# Patient Record
Sex: MALE | Race: WHITE | NOT HISPANIC OR LATINO | Employment: OTHER | URBAN - METROPOLITAN AREA
[De-identification: names, ages, dates, MRNs, and addresses within clinical notes are randomized per-mention and may not be internally consistent; named-entity substitution may affect disease eponyms.]

---

## 2017-11-21 ENCOUNTER — OFFICE VISIT (OUTPATIENT)
Dept: FAMILY MEDICINE | Facility: CLINIC | Age: 52
End: 2017-11-21
Payer: COMMERCIAL

## 2017-11-21 ENCOUNTER — RADIANT APPOINTMENT (OUTPATIENT)
Dept: GENERAL RADIOLOGY | Facility: CLINIC | Age: 52
End: 2017-11-21
Attending: FAMILY MEDICINE
Payer: COMMERCIAL

## 2017-11-21 VITALS
SYSTOLIC BLOOD PRESSURE: 134 MMHG | DIASTOLIC BLOOD PRESSURE: 80 MMHG | HEIGHT: 75 IN | HEART RATE: 100 BPM | WEIGHT: 186 LBS | TEMPERATURE: 97.3 F | BODY MASS INDEX: 23.13 KG/M2

## 2017-11-21 DIAGNOSIS — M54.40 LOW BACK PAIN WITH SCIATICA, SCIATICA LATERALITY UNSPECIFIED, UNSPECIFIED BACK PAIN LATERALITY, UNSPECIFIED CHRONICITY: ICD-10-CM

## 2017-11-21 DIAGNOSIS — M54.40 LOW BACK PAIN WITH SCIATICA, SCIATICA LATERALITY UNSPECIFIED, UNSPECIFIED BACK PAIN LATERALITY, UNSPECIFIED CHRONICITY: Primary | ICD-10-CM

## 2017-11-21 PROCEDURE — 99214 OFFICE O/P EST MOD 30 MIN: CPT | Performed by: FAMILY MEDICINE

## 2017-11-21 PROCEDURE — 72100 X-RAY EXAM L-S SPINE 2/3 VWS: CPT

## 2017-11-21 RX ORDER — TRAMADOL HYDROCHLORIDE 50 MG/1
50-100 TABLET ORAL EVERY 8 HOURS PRN
Qty: 10 TABLET | Refills: 0 | Status: SHIPPED | OUTPATIENT
Start: 2017-11-21 | End: 2017-12-08

## 2017-11-21 RX ORDER — METHYLPREDNISOLONE 4 MG
TABLET, DOSE PACK ORAL
Qty: 21 TABLET | Refills: 0 | Status: SHIPPED | OUTPATIENT
Start: 2017-11-21 | End: 2018-02-09

## 2017-11-21 NOTE — PROGRESS NOTES
SUBJECTIVE:   Kip Winn is a 52 year old male who presents to clinic today for the following health issues:      Back Pain   Noticed back discomfort almost 1 week ago. Associated with right leg radiation mild numbness. Sitting makes it worse standing make it better. He has tried over-the-counter ibuprofen  which has not helped.  He has similar issues in the past but they resolve on their own. No fall or trauma.    Duration: 1+week        Specific cause: none    Description:   Location of pain: low back bilateral  Character of pain: sharp and dull ache  Pain radiation:radiates into the right buttocks and radiates into the right leg  New numbness or weakness in legs, not attributed to pain:  YES    Intensity:  moderate    History:   Pain interferes with job: YES,   History of back problems: no prior back problems  Any previous MRI or X-rays: None  Sees a specialist for back pain:  No  Therapies tried without relief: NSAIDs    Alleviating factors:   Improved by: none      Precipitating factors:  Worsened by: Sitting    Functional and Psychosocial Screen (Denice STarT Back):      Not performed today          Problem list and histories reviewed & adjusted, as indicated.  Additional history: as documented    Patient Active Problem List   Diagnosis     CARDIOVASCULAR SCREENING; LDL GOAL LESS THAN 130     Smoker     Family history of prostate cancer     Hemangioma of forehead     Low HDL (under 40)     Past Surgical History:   Procedure Laterality Date     EYE SURGERY      Lasik 5 years ago     NO HISTORY OF SURGERY         Social History   Substance Use Topics     Smoking status: Current Every Day Smoker     Packs/day: 1.00     Years: 20.00     Types: Cigarettes     Smokeless tobacco: Never Used     Alcohol use 0.0 oz/week     0 Standard drinks or equivalent per week      Comment: 2 drinks a month     Family History   Problem Relation Age of Onset     Hypertension Mother      C.A.D. Maternal Grandfather      50s   "from heart disease     Prostate Cancer Father 61     Hypertension Maternal Grandmother      DIABETES Mother      borderline         Current Outpatient Prescriptions   Medication Sig Dispense Refill     methylPREDNISolone (MEDROL DOSEPAK) 4 MG tablet Follow package instructions 21 tablet 0     traMADol (ULTRAM) 50 MG tablet Take 1-2 tablets ( mg) by mouth every 8 hours as needed for pain 10 tablet 0     Ascorbic Acid (VITAMIN C PO) Take 1 tablet by mouth.       VITAMIN D PO Take 1 tablet by mouth.       ASPIRIN 81 MG OR TABS ONE DAILY 100 3     MULTI-VITAMIN OR TABS 1 TABLET DAILY           Reviewed and updated as needed this visit by clinical staffTobacco  Allergies  Meds       Reviewed and updated as needed this visit by Provider         ROS:  C: NEGATIVE for fever, chills, change in weight  ROS otherwise negative    OBJECTIVE:                                                    /80  Pulse 100  Temp 97.3  F (36.3  C) (Tympanic)  Ht 6' 2.5\" (1.892 m)  Wt 186 lb (84.4 kg)  BMI 23.56 kg/m2  Body mass index is 23.56 kg/(m^2).   GENERAL: healthy, alert, well nourished, well hydrated, no distress  RESP: lungs clear to auscultation - no rales, no rhonchi, no wheezes  CV: regular rates and rhythm, normal S1 S2, no S3 or S4 and no murmur, no click or rub -  ABDOMEN: soft, no tenderness, no  hepatosplenomegaly, no masses, normal bowel sounds  Pain in the paraspinous area on the right side in the lumbar region.   Straight leg raise is positive on the right side.      ASSESSMENT/PLAN:                                                        ICD-10-CM    1. Low back pain with sciatica, sciatica laterality unspecified, unspecified back pain laterality, unspecified chronicity M54.40 XR Lumbar Spine 2/3 Views     methylPREDNISolone (MEDROL DOSEPAK) 4 MG tablet     traMADol (ULTRAM) 50 MG tablet       Patient lumbar x-rays reviewed. No obvious abnormality. Follow up on official report. Patient has low back pain " with possible L4-L5 nerve inflammation mild compression. We will start patient on Medrol Dosepak along with tramadol only as needed. He will follow up in 2 weeks if any change in symptoms. If pain is worsening or not getting better I would get an MRI and make further recommendation based on MRI results.  Warning signs were discussed with the patient.    Dany Richardson MD  [facility] [place]

## 2017-11-21 NOTE — NURSING NOTE
"Chief Complaint   Patient presents with     Back Pain       Initial /80  Pulse 100  Temp 97.3  F (36.3  C) (Tympanic)  Ht 6' 2.5\" (1.892 m)  Wt 186 lb (84.4 kg)  BMI 23.56 kg/m2 Estimated body mass index is 23.56 kg/(m^2) as calculated from the following:    Height as of this encounter: 6' 2.5\" (1.892 m).    Weight as of this encounter: 186 lb (84.4 kg).  Medication Reconciliation: complete    Current Outpatient Prescriptions   Medication Sig Dispense Refill     Ascorbic Acid (VITAMIN C PO) Take 1 tablet by mouth.       VITAMIN D PO Take 1 tablet by mouth.       ASPIRIN 81 MG OR TABS ONE DAILY 100 3     MULTI-VITAMIN OR TABS 1 TABLET DAILY         Daniel PRESSLEY CMA  "

## 2017-11-21 NOTE — MR AVS SNAPSHOT
"              After Visit Summary   11/21/2017    Kip Winn    MRN: [id number]           Patient Information     Date Of Birth          1965        Visit Information        Provider Department      11/21/2017 9:40 AM Dany Richardson MD [facility] [place]        Today's Diagnoses     Low back pain with sciatica, sciatica laterality unspecified, unspecified back pain laterality, unspecified chronicity    -  1       Follow-ups after your visit        Who to contact     If you have questions or need follow up information about today's clinic visit or your schedule please contact [facility] [place] directly at [phone].  Normal or non-critical lab and imaging results will be communicated to you by MyChart, letter or phone within 4 business days after the clinic has received the results. If you do not hear from us within 7 days, please contact the clinic through "[employer] or phone. If you have a critical or abnormal lab result, we will notify you by phone as soon as possible.  Submit refill requests through [employer] [facility] [employer] or call your pharmacy and they will forward the refill request to us. Please allow 3 business days for your refill to be completed.          Additional Information About Your Visit        MyChart Information     [employer] gives you secure access to your electronic health record. If you see a primary care provider, you can also send messages to your care team and make appointments. If you have questions, please call your primary care clinic.  If you do not have a primary care provider, please call [phone] and they will assist you.        Care EveryWhere ID     This is your Care EveryWhere ID. This could be used by other organizations to access your [facility] medical records  [id number]        Your Vitals Were     Pulse Temperature Height BMI (Body Mass Index)          100 97.3  F (36.3  C) (Tympanic) 6' 2.5\" (1.892 m) 23.56 kg/m2         Blood Pressure from Last 3 Encounters: "   11/21/17 134/80   10/07/16 112/64   10/06/15 104/60    Weight from Last 3 Encounters:   11/21/17 186 lb (84.4 kg)   10/07/16 197 lb (89.4 kg)   10/06/15 195 lb (88.5 kg)                 Today's Medication Changes          These changes are accurate as of: 11/21/17 10:21 AM.  If you have any questions, ask your nurse or doctor.               Start taking these medicines.        Dose/Directions    methylPREDNISolone 4 MG tablet   Commonly known as:  MEDROL DOSEPAK   Used for:  Low back pain with sciatica, sciatica laterality unspecified, unspecified back pain laterality, unspecified chronicity   Started by:  Dany Richardson MD        Follow package instructions   Quantity:  21 tablet   Refills:  0       traMADol 50 MG tablet   Commonly known as:  ULTRAM   Used for:  Low back pain with sciatica, sciatica laterality unspecified, unspecified back pain laterality, unspecified chronicity   Started by:  Dany Richardson MD        Dose:   mg   Take 1-2 tablets ( mg) by mouth every 8 hours as needed for pain   Quantity:  10 tablet   Refills:  0            Where to get your medicines      These medications were sent to [place] [address] [place] [facility] [place] - [address]  [address] [place] [address]     Phone:  [phone]     methylPREDNISolone 4 MG tablet         Some of these will need a paper prescription and others can be bought over the counter.  Ask your nurse if you have questions.     Bring a paper prescription for each of these medications     traMADol 50 MG tablet                Primary Care Provider Office Phone # Fax #    Dany Richardson [phone]       [address]  [place] [address]        Equal Access to Services     SHABBIR TRINH AH: Hadii marisol Arenas, waaxda luqadaha, qaybta kaalmada adeegyada, kika christine. So [facility] [phone].    ATENCIÓN: Si habla español, tiene a capps disposición servicios gratuitos de  jackie rangela. Emely al [phone].    We comply with applicable federal civil rights laws and [place] laws. We do not discriminate on the basis of race, color, national origin, age, disability, sex, sexual orientation, or gender identity.            Thank you!     Thank you for choosing [facility] [place]  for your care. Our goal is always to provide you with excellent care. Hearing back from our patients is one way we can continue to improve our services. Please take a few minutes to complete the written survey that you may receive in the mail after your visit with us. Thank you!             Your Updated Medication List - Protect others around you: Learn how to safely use, store and throw away your medicines at www.disposemymeds.org.          This list is accurate as of: 11/21/17 10:21 AM.  Always use your most recent med list.                   Brand Name Dispense Instructions for use Diagnosis    aspirin 81 MG tablet     100    ONE DAILY        methylPREDNISolone 4 MG tablet    MEDROL DOSEPAK    21 tablet    Follow package instructions    Low back pain with sciatica, sciatica laterality unspecified, unspecified back pain laterality, unspecified chronicity       Multi-vitamin Tabs tablet   Generic drug:  multivitamin, therapeutic with minerals      1 TABLET DAILY        traMADol 50 MG tablet    ULTRAM    10 tablet    Take 1-2 tablets ( mg) by mouth every 8 hours as needed for pain    Low back pain with sciatica, sciatica laterality unspecified, unspecified back pain laterality, unspecified chronicity       VITAMIN C PO      Take 1 tablet by mouth.        VITAMIN D PO      Take 1 tablet by mouth.

## 2017-11-24 ENCOUNTER — TRANSFERRED RECORDS (OUTPATIENT)
Dept: HEALTH INFORMATION MANAGEMENT | Facility: CLINIC | Age: 52
End: 2017-11-24

## 2017-11-30 ENCOUNTER — MYC MEDICAL ADVICE (OUTPATIENT)
Dept: FAMILY MEDICINE | Facility: CLINIC | Age: 52
End: 2017-11-30

## 2017-11-30 DIAGNOSIS — M54.16 LUMBAR RADICULAR PAIN: Primary | ICD-10-CM

## 2017-11-30 NOTE — TELEPHONE ENCOUNTER
Please see [employer] message and advise.      Thank you,  Jessica Ruvalcaba RN  [facility]  [phone]

## 2017-11-30 NOTE — TELEPHONE ENCOUNTER
Confirmed that [facility] had the ordered and that the priority was STAT. Notified the patient per My Chart that they would be contacting him soon to schedule. Manisha Reveles RN

## 2017-12-01 ENCOUNTER — TRANSFERRED RECORDS (OUTPATIENT)
Dept: HEALTH INFORMATION MANAGEMENT | Facility: CLINIC | Age: 52
End: 2017-12-01

## 2017-12-05 ENCOUNTER — TELEPHONE (OUTPATIENT)
Dept: FAMILY MEDICINE | Facility: CLINIC | Age: 52
End: 2017-12-05

## 2017-12-05 DIAGNOSIS — M51.26 DISPLACEMENT OF LUMBAR INTERVERTEBRAL DISC WITHOUT MYELOPATHY: Primary | ICD-10-CM

## 2017-12-05 DIAGNOSIS — M54.16 LUMBAR RADICULOPATHY: ICD-10-CM

## 2017-12-05 NOTE — TELEPHONE ENCOUNTER
Spoke with the patient regarding his MRI.  He has L4-L5 disc herniation with impingement of nerve.  He is referred to neurosurgery for evaluation.  Referral provided.  Warning signs were discussed with the patient.  His pain is well controlled.

## 2017-12-08 ENCOUNTER — MYC REFILL (OUTPATIENT)
Dept: FAMILY MEDICINE | Facility: CLINIC | Age: 52
End: 2017-12-08

## 2017-12-08 DIAGNOSIS — M54.40 LOW BACK PAIN WITH SCIATICA, SCIATICA LATERALITY UNSPECIFIED, UNSPECIFIED BACK PAIN LATERALITY, UNSPECIFIED CHRONICITY: ICD-10-CM

## 2017-12-08 RX ORDER — TRAMADOL HYDROCHLORIDE 50 MG/1
50-100 TABLET ORAL EVERY 8 HOURS PRN
Qty: 10 TABLET | Refills: 0 | Status: SHIPPED | OUTPATIENT
Start: 2017-12-08 | End: 2018-02-09

## 2017-12-08 NOTE — TELEPHONE ENCOUNTER
Controlled Substance Refill Request for Tramadol  Problem List Complete:  No     PROVIDER TO CONSIDER COMPLETION OF PROBLEM LIST AND OVERVIEW/CONTROLLED SUBSTANCE AGREEMENT    Last Written Prescription Date:  11/21/2017  Last Fill Quantity: 10,   # refills: 0    Last Office Visit with [facility] primary care provider: 11/21/2017    Future Office visit:     Controlled substance agreement on file: No.     Processing:  Patient will  in clinic     checked in past 6 months?  Yes 12/8/2017 - No concern noted. only previous script noted     Routing refill request to provider for review/approval because:  Drug not on the [facility], [facility] or [facility] refill protocol or controlled substance    Olive Duran RN - Triage  [facility]

## 2017-12-08 NOTE — TELEPHONE ENCOUNTER
Message from [employer]:  Original authorizing provider: MD Kip Brody would like a refill of the following medications:  traMADol (ULTRAM) 50 MG tablet [Dany Richardson MD]    [facility] [address] [place] [facility] [place] [facility] [address]    Comment:

## 2017-12-13 ENCOUNTER — OFFICE VISIT (OUTPATIENT)
Dept: NEUROSURGERY | Facility: CLINIC | Age: 52
End: 2017-12-13
Attending: NURSE PRACTITIONER
Payer: COMMERCIAL

## 2017-12-13 VITALS — HEIGHT: 74 IN | BODY MASS INDEX: 22.95 KG/M2 | WEIGHT: 178.8 LBS

## 2017-12-13 DIAGNOSIS — M54.16 LUMBAR RADICULOPATHY: Primary | ICD-10-CM

## 2017-12-13 PROCEDURE — 99211 OFF/OP EST MAY X REQ PHY/QHP: CPT | Performed by: NURSE PRACTITIONER

## 2017-12-13 PROCEDURE — 99243 OFF/OP CNSLTJ NEW/EST LOW 30: CPT | Performed by: NURSE PRACTITIONER

## 2017-12-13 ASSESSMENT — PAIN SCALES - GENERAL: PAINLEVEL: MODERATE PAIN (5)

## 2017-12-13 NOTE — MR AVS SNAPSHOT
After Visit Summary   12/13/2017    Kip Winn    MRN: [id number]           Patient Information     Date Of Birth          1965        Visit Information        Provider Department      12/13/2017 1:10 PM Yvonne Jimenez NP [facility]        Today's Diagnoses     Lumbar radiculopathy    -  1      Care Instructions    Schedule lumbar epidural steroid injection (someone will contact you)  Schedule physical therapy (someone will contact you)  Please contact the clinic if pain persists at [phone].            Follow-ups after your visit        Additional Services     [facility] PT, HAND, AND CHIROPRACTIC REFERRAL       **This order will print in the [facility] Scheduling Office**    Physical Therapy, Hand Therapy and Chiropractic Care are available through:    *[facility]  *[facility]  *[facility]    Call one number to schedule at any of the above locations: [phone].    Your provider has referred you to: Physical Therapy at [facility] or [facility]    Indication/Reason for Referral: Low Back Pain, right leg pain  Onset of Illness: 3 weeks ago  Therapy Orders: Evaluate and Treat  Special Programs: None  Special Request: None    [place] Galdamez      Additional Comments for the Therapist or Chiropractor:       Please be aware that coverage of these services is subject to the terms and limitations of your health insurance plan.  Call member services at your health plan with any benefit or coverage questions.      Please bring the following to your appointment:    *Your personal calendar for scheduling future appointments  *Comfortable clothing                  Future tests that were ordered for you today     Open Future Orders        Priority Expected Expires Ordered    XR Lumbar Transforaminal Inj Single Routine 12/13/2017 12/13/2018 12/13/2017            Who to contact     If you have questions or need follow up information about  "today's clinic visit or your schedule please contact [facility] directly at [phone].  Normal or non-critical lab and imaging results will be communicated to you by MyChart, letter or phone within 4 business days after the clinic has received the results. If you do not hear from us within 7 days, please contact the clinic through [email or web address] or phone. If you have a critical or abnormal lab result, we will notify you by phone as soon as possible.  Submit refill requests through [employer] or call your pharmacy and they will forward the refill request to us. Please allow 3 business days for your refill to be completed.          Additional Information About Your Visit        [email or web address] Information     [employer] gives you secure access to your electronic health record. If you see a primary care provider, you can also send messages to your care team and make appointments. If you have questions, please call your primary care clinic.  If you do not have a primary care provider, please call [phone] and they will assist you.        Care EveryWhere ID     This is your Care EveryWhere ID. This could be used by other organizations to access your [place] medical records  [id number]        Your Vitals Were     Height BMI (Body Mass Index)                6' 2.41\" (1.89 m) 22.7 kg/m2           Blood Pressure from Last 3 Encounters:   11/21/17 134/80   10/07/16 112/64   10/06/15 104/60    Weight from Last 3 Encounters:   12/13/17 178 lb 12.8 oz (81.1 kg)   11/21/17 186 lb (84.4 kg)   10/07/16 197 lb (89.4 kg)              We Performed the Following     [facility] PT, HAND, AND CHIROPRACTIC REFERRAL        Primary Care Provider Office Phone # Fax #    Dany Richardson [phone]       [address]  [place] [address]        Equal Access to Services     GURWINDER TRINH AH: Hadii marisol ku hadasho Soomaali, waaxda luqadaha, qaybta kaalmada adeegyada, waxay camille christine. So " [facility] [phone].    ATENCIÓN: Si zheng parry, tiene a capps disposición servicios gratuitos de asistencia lingüística. Emely cruz [phone].    We comply with applicable federal civil rights laws and [place] laws. We do not discriminate on the basis of race, color, national origin, age, disability, sex, sexual orientation, or gender identity.            Thank you!     Thank you for choosing [facility]  for your care. Our goal is always to provide you with excellent care. Hearing back from our patients is one way we can continue to improve our services. Please take a few minutes to complete the written survey that you may receive in the mail after your visit with us. Thank you!             Your Updated Medication List - Protect others around you: Learn how to safely use, store and throw away your medicines at www.disposemymeds.org.          This list is accurate as of: 12/13/17  1:37 PM.  Always use your most recent med list.                   Brand Name Dispense Instructions for use Diagnosis    aspirin 81 MG tablet     100    ONE DAILY        methylPREDNISolone 4 MG tablet    MEDROL DOSEPAK    21 tablet    Follow package instructions    Low back pain with sciatica, sciatica laterality unspecified, unspecified back pain laterality, unspecified chronicity       Multi-vitamin Tabs tablet   Generic drug:  multivitamin, therapeutic with minerals      1 TABLET DAILY        traMADol 50 MG tablet    ULTRAM    10 tablet    Take 1-2 tablets ( mg) by mouth every 8 hours as needed for pain    Low back pain with sciatica, sciatica laterality unspecified, unspecified back pain laterality, unspecified chronicity       VITAMIN C PO      Take 1 tablet by mouth.        VITAMIN D PO      Take 1 tablet by mouth.

## 2017-12-13 NOTE — PATIENT INSTRUCTIONS
Schedule lumbar epidural steroid injection (someone will contact you)  Schedule physical therapy (someone will contact you)  Please contact the clinic if pain persists at [phone].

## 2017-12-13 NOTE — LETTER
"    12/13/2017         RE: Kip Winn  [address]  [place] [address]        Dear Colleague,    Thank you for referring your patient, Kip Winn, to the [facility] NEUROSURGERY CLINIC. Please see a copy of my visit note below.    Dr. Armando Rico  [facility]  Neurosurgery Clinic Visit      CC: Right leg pain    Primary care Provider: Dany Richardson      Reason For Visit:   I was asked by Dr. Richardson to consult on the patient for lumbar radiculopathy.      HPI: Kip Winn is a 52 year old male with RLE pain that started about 3 weeks ago without any precipitating cause.  He states he, \"Just started to notice a pain that increased in my leg.\"  He was seen at the ED about 2 1/2 weeks ago and states, \"They did nothing.\"  The patient states over the past 3-4 days it has been somewhat \"stable\".  He describes the pain as a constant throbbing and shooting pain along the lateroposterior RLE with numbness in the foot.  He states the pain tends to increase with walking, bending and lifting.  He has some relief while lying down. He had no benefit with a medrol dose pack.  He has not had injections.  He describes weakness to the RLE when compared to the LLE.  He feels like his foot is dragging since the leg pain started.. He denies falls.  He denies changes to bowel or bladder.  We reviewed MRI results today.       Pain at its worst 8-9  Pain right now:  4-5    Past Medical History:   Diagnosis Date     Coronary artery disease      Fatigue      Fatigue 7/1/2011     Smoker 7/1/2011       Past Medical History reviewed with patient during visit.    Past Surgical History:   Procedure Laterality Date     EYE SURGERY      Lasik 5 years ago     NO HISTORY OF SURGERY       Past Surgical History reviewed with patient during visit.    Current Outpatient Prescriptions   Medication     traMADol (ULTRAM) 50 MG tablet     Ascorbic Acid (VITAMIN C PO)     VITAMIN D PO     ASPIRIN 81 MG OR TABS     " MULTI-VITAMIN OR TABS     methylPREDNISolone (MEDROL DOSEPAK) 4 MG tablet     No current facility-administered medications for this visit.        Allergies   Allergen Reactions     Penicillins      Throat felt like it was closing and bloody nose       Social History     Social History     Marital status: Significant other     Spouse name: N/A     Number of children: 0     Years of education: N/A     Occupational History          Social History Main Topics     Smoking status: Current Every Day Smoker     Packs/day: 1.00     Years: 20.00     Types: Cigarettes     Smokeless tobacco: Never Used     Alcohol use 0.0 oz/week     0 Standard drinks or equivalent per week      Comment: 2 drinks a month     Drug use: No     Sexual activity: Yes     Partners: Male     Other Topics Concern     Parent/Sibling W/ Cabg, Mi Or Angioplasty Before 65f 55m? No     Social History Narrative    Dairy/d 1 servings/d     Caffeine 2 servings/d    Exercise 0 x week    Sunscreen used - Yes    Seatbelts used - Yes    Working smoke/CO detectors in the home - Yes    Guns stored in the home - No    Self Breast Exams - NA    Self Testicular Exam - No    Eye Exam up to date - Yes    Dental Exam up to date - Yes    Pap Smear up to date - NA    Mammogram up to date - NA    PSA up to date - No    Dexa Scan up to date - NA    Flex Sig / Colonoscopy up to date - No    Immunizations up to date - Yes    Abuse: Current or Past(Physical, Sexual or Emotional)- No    Do you feel safe in your environment - Yes    2009  M. Nace, CMA                        Dairy/d 2-3 servings/d    Caffeine 1-2 servings/d    Exercise 0 x week    Sunscreen used - Yes    Seatbelts used - Yes    Working smoke/CO detectors in the home - Yes    Guns stored in the home - No    Self Breast Exams - NA    Self Testicular Exam - No    Eye Exam up to date - Yes 2006    Dental Exam up to date - Yes 2007    Pap Smear up to date - NA    Mammogram up to date - NA    PSA up to date  "- NA    Dexa Scan up to date - NA    Flex Sig / Colonoscopy up to date - NA    Immunizations up to date - Yes     Abuse: Current or Past(Physical, Sexual or Emotional)- No    Do you feel safe in your environment - Yes           Family History   Problem Relation Age of Onset     Hypertension Mother      DIABETES Mother      borderline     Prostate Cancer Father 61     C.A.D. Maternal Grandfather      50s  from heart disease     Hypertension Maternal Grandmother          ROS: 10 point ROS neg other than the symptoms noted above in the HPI.    Vital Signs: Ht 6' 2.41\" (1.89 m)  Wt 178 lb 12.8 oz (81.1 kg)  BMI 22.7 kg/m2    Examination:  Constitutional:  Alert, well nourished, NAD.  HEENT: Normocephalic, atraumatic.   Pulm:  Without shortness of breath   CV:  No pitting edema of BLE.    Neurological:  Awake  Alert  Oriented x 3  Speech clear  Cranial nerves II - XII intact      Motor exam   Shoulder Abduction:  Right:  5/5   Left:  5/5  Biceps:                      Right:  5/5   Left:  5/5  Triceps:                     Right:  5/5   Left:  5/5  Wrist Extensors:       Right:  5/5   Left:  5/5  Wrist Flexors:           Right:  5/5   Left:  5/5  Intrinsics:                   Right:  5/5   Left:  5/5  Hip Flexor:                Right: 5/5  Left:  5/5  Hip Adductor:             Right:  5/5  Left:  5/5  Hip Abductor:             Right:  5/5  Left:  5/5  Gastroc Soleus:        Right:  5/5  Left:  5/5  Tib/Ant:                      Right:  4/5  Left:  5/5  EHL:                          Right:  4/5  Left:  5/5   Sensation normal to bilateral upper and lower extremities  DTRs 2+ symmetric  Clonus negative  Gait: Able to stand from a seated position. Normal non-antalgic, non-myelopathic gait.    Cervical examination reveals good range of motion.  No tenderness to palpation of the cervical spine or paraspinous muscles bilaterally.    Lumbar examination reveals tenderness of the spine and paraspinous muscles.  Hip " "height is symmetrical. Negative SI joint, sciatic notch or greater trochanteric tenderness to palpation bilaterally.  Straight leg raise positive on the right.     Imaging: Lumbar MRI 12/1/17  -At L4-5 right of center extruded disc herniation with impingement of right traversing L5 nerve root.    Assessment/Plan:   Lumbar DDD  Lumbar Radiculopathy    Kip Winn is a 52 year old male with RLE pain that started about 3 weeks ago without any precipitating cause.  He states he, \"Just started to notice a pain that increased in my leg.\"  He was seen at the ED about 2 1/2 weeks ago and states, \"They did nothing.\"  The patient states over the past 3-4 days it has been somewhat \"stable\".  He describes the pain as a constant throbbing and shooting pain along the lateroposterior RLE with numbness in the foot.  He states the pain tends to increase with walking, bending and lifting.  He has some relief while lying down. He had no benefit with a medrol dose pack.  He has not had injections.  He describes weakness to the RLE when compared to the LLE.  He feels like his foot is dragging. He denies falls.  He denies changes to bowel or bladder.  We reviewed MRI results today. The patient would like to avoid surgery if possible and is interested in first trying a lumbar SMOOTH and PT.  We will order both but discussed that should the pain increase and/or increased weakness to RLE/foot or not improving he should contact us.He is agreeable.         Patient Instructions   Schedule lumbar epidural steroid injection (someone will contact you)  Schedule physical therapy (someone will contact you)  Please contact the clinic if pain persists at [phone].            Yvonne Jimenez [facility]  [facility]  [address]  [address] [place] [address]    Tel [phone]  Pager [phone]    Again, thank you for allowing me to participate in the care of your patient.        Sincerely,        Yvonne" CONRAD Jimenez, NP

## 2017-12-13 NOTE — PROGRESS NOTES
"Dr. Armando Rico  [facility]  Neurosurgery Clinic Visit      CC: Right leg pain    Primary care Provider: Dany Richardson      Reason For Visit:   I was asked by Dr. Richardson to consult on the patient for lumbar radiculopathy.      HPI: Kip Winn is a 52 year old male with RLE pain that started about 3 weeks ago without any precipitating cause.  He states he, \"Just started to notice a pain that increased in my leg.\"  He was seen at the ED about 2 1/2 weeks ago and states, \"They did nothing.\"  The patient states over the past 3-4 days it has been somewhat \"stable\".  He describes the pain as a constant throbbing and shooting pain along the lateroposterior RLE with numbness in the foot.  He states the pain tends to increase with walking, bending and lifting.  He has some relief while lying down. He had no benefit with a medrol dose pack.  He has not had injections.  He describes weakness to the RLE when compared to the LLE.  He feels like his foot is dragging since the leg pain started.. He denies falls.  He denies changes to bowel or bladder.  We reviewed MRI results today.       Pain at its worst 8-9  Pain right now:  4-5    Past Medical History:   Diagnosis Date     Coronary artery disease      Fatigue      Fatigue 7/1/2011     Smoker 7/1/2011       Past Medical History reviewed with patient during visit.    Past Surgical History:   Procedure Laterality Date     EYE SURGERY      Lasik 5 years ago     NO HISTORY OF SURGERY       Past Surgical History reviewed with patient during visit.    Current Outpatient Prescriptions   Medication     traMADol (ULTRAM) 50 MG tablet     Ascorbic Acid (VITAMIN C PO)     VITAMIN D PO     ASPIRIN 81 MG OR TABS     MULTI-VITAMIN OR TABS     methylPREDNISolone (MEDROL DOSEPAK) 4 MG tablet     No current facility-administered medications for this visit.        Allergies   Allergen Reactions     Penicillins      Throat felt like it was closing and bloody nose "       Social History     Social History     Marital status: Significant other     Spouse name: N/A     Number of children: 0     Years of education: N/A     Occupational History          Social History Main Topics     Smoking status: Current Every Day Smoker     Packs/day: 1.00     Years: 20.00     Types: Cigarettes     Smokeless tobacco: Never Used     Alcohol use 0.0 oz/week     0 Standard drinks or equivalent per week      Comment: 2 drinks a month     Drug use: No     Sexual activity: Yes     Partners: Male     Other Topics Concern     Parent/Sibling W/ Cabg, Mi Or Angioplasty Before 65f 55m? No     Social History Narrative    Dairy/d 1 servings/d     Caffeine 2 servings/d    Exercise 0 x week    Sunscreen used - Yes    Seatbelts used - Yes    Working smoke/CO detectors in the home - Yes    Guns stored in the home - No    Self Breast Exams - NA    Self Testicular Exam - No    Eye Exam up to date - Yes    Dental Exam up to date - Yes    Pap Smear up to date - NA    Mammogram up to date - NA    PSA up to date - No    Dexa Scan up to date - NA    Flex Sig / Colonoscopy up to date - No    Immunizations up to date - Yes    Abuse: Current or Past(Physical, Sexual or Emotional)- No    Do you feel safe in your environment - Yes    2009  M. Biancae, CMA                        Dairy/d 2-3 servings/d    Caffeine 1-2 servings/d    Exercise 0 x week    Sunscreen used - Yes    Seatbelts used - Yes    Working smoke/CO detectors in the home - Yes    Guns stored in the home - No    Self Breast Exams - NA    Self Testicular Exam - No    Eye Exam up to date - Yes 2006    Dental Exam up to date - Yes 2007    Pap Smear up to date - NA    Mammogram up to date - NA    PSA up to date - NA    Dexa Scan up to date - NA    Flex Sig / Colonoscopy up to date - NA    Immunizations up to date - Yes 2006    Abuse: Current or Past(Physical, Sexual or Emotional)- No    Do you feel safe in your environment - Yes    2008       Family  "History   Problem Relation Age of Onset     Hypertension Mother      DIABETES Mother      borderline     Prostate Cancer Father 61     C.A.D. Maternal Grandfather      50s  from heart disease     Hypertension Maternal Grandmother          ROS: 10 point ROS neg other than the symptoms noted above in the HPI.    Vital Signs: Ht 6' 2.41\" (1.89 m)  Wt 178 lb 12.8 oz (81.1 kg)  BMI 22.7 kg/m2    Examination:  Constitutional:  Alert, well nourished, NAD.  HEENT: Normocephalic, atraumatic.   Pulm:  Without shortness of breath   CV:  No pitting edema of BLE.    Neurological:  Awake  Alert  Oriented x 3  Speech clear  Cranial nerves II - XII intact      Motor exam   Shoulder Abduction:  Right:  5/5   Left:  5/5  Biceps:                      Right:  5/5   Left:  5/5  Triceps:                     Right:  5/5   Left:  5/5  Wrist Extensors:       Right:  5/5   Left:  5/5  Wrist Flexors:           Right:  5/5   Left:  5/5  Intrinsics:                   Right:  5/5   Left:  5/5  Hip Flexor:                Right: 5/5  Left:  5/5  Hip Adductor:             Right:  5/5  Left:  5/5  Hip Abductor:             Right:  5/5  Left:  5/5  Gastroc Soleus:        Right:  5/5  Left:  5/5  Tib/Ant:                      Right:  4/5  Left:  5/5  EHL:                          Right:  4/5  Left:  5/5   Sensation normal to bilateral upper and lower extremities  DTRs 2+ symmetric  Clonus negative  Gait: Able to stand from a seated position. Normal non-antalgic, non-myelopathic gait.    Cervical examination reveals good range of motion.  No tenderness to palpation of the cervical spine or paraspinous muscles bilaterally.    Lumbar examination reveals tenderness of the spine and paraspinous muscles.  Hip height is symmetrical. Negative SI joint, sciatic notch or greater trochanteric tenderness to palpation bilaterally.  Straight leg raise positive on the right.     Imaging: Lumbar MRI 17  -At L4-5 right of center extruded disc herniation " "with impingement of right traversing L5 nerve root.    Assessment/Plan:   Lumbar DDD  Lumbar Radiculopathy    Kip Winn is a 52 year old male with RLE pain that started about 3 weeks ago without any precipitating cause.  He states he, \"Just started to notice a pain that increased in my leg.\"  He was seen at the ED about 2 1/2 weeks ago and states, \"They did nothing.\"  The patient states over the past 3-4 days it has been somewhat \"stable\".  He describes the pain as a constant throbbing and shooting pain along the lateroposterior RLE with numbness in the foot.  He states the pain tends to increase with walking, bending and lifting.  He has some relief while lying down. He had no benefit with a medrol dose pack.  He has not had injections.  He describes weakness to the RLE when compared to the LLE.  He feels like his foot is dragging. He denies falls.  He denies changes to bowel or bladder.  We reviewed MRI results today. The patient would like to avoid surgery if possible and is interested in first trying a lumbar SMOOTH and PT.  We will order both but discussed that should the pain increase and/or increased weakness to RLE/foot or not improving he should contact us.He is agreeable.         Patient Instructions   Schedule lumbar epidural steroid injection (someone will contact you)  Schedule physical therapy (someone will contact you)  Please contact the clinic if pain persists at [phone].            Yvonne Jimenez [facility]  [facility] and [facility] Clinic  [address]  [address] [place] [address]    Tel [phone]  Pager [phone]  "

## 2017-12-20 ENCOUNTER — THERAPY VISIT (OUTPATIENT)
Dept: PHYSICAL THERAPY | Facility: CLINIC | Age: 52
End: 2017-12-20
Payer: COMMERCIAL

## 2017-12-20 DIAGNOSIS — M51.26 HERNIATED NUCLEUS PULPOSUS, L4-5 RIGHT: Primary | ICD-10-CM

## 2017-12-20 DIAGNOSIS — M54.16 LUMBAR RADICULOPATHY: ICD-10-CM

## 2017-12-20 PROCEDURE — 97161 PT EVAL LOW COMPLEX 20 MIN: CPT | Mod: GP | Performed by: PHYSICAL THERAPIST

## 2017-12-20 PROCEDURE — 97110 THERAPEUTIC EXERCISES: CPT | Mod: GP | Performed by: PHYSICAL THERAPIST

## 2017-12-20 NOTE — PROGRESS NOTES
[facility] Evaluation  Subjective:    Patient is a 52 year old male presenting with rehab back hpi.   Kip Winn is a 52 year old male with a lumbar condition.  Condition occurred with:  Insidious onset.  Condition occurred: for unknown reasons.  This is a new condition  Patient is referred with a one month hx of R buttock and L/E pain and paresthesia to the foot. He noted onset of sxs on 11/22/17 and sxs prgressively worsened to the point where he had to go to urgent care. No response to steroid dose pack. He notes pain to the ankle and foot and has noted some mild drop foot. Worse with sitting, bending and with cough/sneeze. Better walking and lying down..    Patient reports pain:  Lumbar spine right.  Radiates to:  Gluteals right, thigh right, lower leg right and foot right.  Pain is described as aching and is constant and reported as 2/10.  Associated symptoms:  Loss of strength, loss of motion/stiffness, numbness and tingling. Pain is worse in the A.M..  Symptoms are exacerbated by lifting, bending and sitting and relieved by activity/movement.  Since onset symptoms are unchanged.  Special tests:  MRI.      General health as reported by patient is good.            Patient is working in normal job without restrictions.  Primary job tasks include:  Prolonged sitting.    Barriers include:  None as reported by the patient.    Red flags:  None as reported by the patient.                        Objective:    System         Lumbar/SI Evaluation    Lumbar Myotomes:    T12-L3 (Hip Flex):  Left: 5    Right: 5  L2-4 (Quads):  Left:  5    Right:  5  L4 (Ankle DF):  Left:  5    Right:  4  L5 (Great Toe Ext): Left: 5    Right: 3   S1 (Toe Raise):  Left: 5    Right: 5  Lumbar DTR's:  normal      Cord Signs:  not assessed    Lumbar Dermtomes:  normal                Neural Tension/Mobility:    Left side:  Slump positive.   Right side:   SLR w/DF; Slump and SLR positive.  Lumbar Palpation:  not  assessed                                                           [facility] Lumbar Evaluation    Posture:  Sitting: poor  Standing: fair  Lordosis: Reduced  Lateral Shift: yes and left  Correction of Posture: no effect  Other Observations: L deviation in flexion  Movement Loss:  Flexion (Flex): major and pain  Extension (EXT): major and pain  Side [place] R (SG R): mod and pain  Side Glide L (SG L): mod and pain  Test Movements:      CAROLYNE: During: no effect  After: no effect  Mechanical Response: no effect  Repeat CAROLYNE: During: increases  After: no worse  Mechanical Response: no effect        Static Tests:          Lying Prone in Extension: increases sxs into R L/E    Conclusion: derangement  Principle of Treatment:      Extension: trial of prone lying with pillows  Lateral: R or L sidelying positional traction  Other: ptx  trial                                       ROS    Assessment/Plan:      Patient is a 52 year old male with lumbar complaints.    Patient has the following significant findings with corresponding treatment plan.                Diagnosis 1:  Right L4-5 HNP  Pain -  mechanical traction, manual therapy, self management, education, directional preference exercise and home program  Decreased ROM/flexibility - manual therapy, therapeutic exercise and home program  Decreased strength - therapeutic exercise, therapeutic activities and home program  Impaired gait - home program  Impaired muscle performance - neuro re-education and home program  Decreased function - therapeutic activities and home program  Impaired posture - neuro re-education and home program    Therapy Evaluation Codes:   1) History comprised of:   Personal factors that impact the plan of care:      None.    Comorbidity factors that impact the plan of care are:      None.     Medications impacting care: None.  2) Examination of Body Systems comprised of:   Body structures and functions that impact the plan of care:      Lumbar  spine.   Activity limitations that impact the plan of care are:      Bending, Dressing, Lifting, Reading/Computer work and Sitting.  3) Clinical presentation characteristics are:   Stable/Uncomplicated.  4) Decision-Making    Low complexity using standardized patient assessment instrument and/or measureable assessment of functional outcome.  Cumulative Therapy Evaluation is: Low complexity.    Previous and current functional limitations:  (See Goal Flow Sheet for this information)    Short term and Long term goals: (See Goal Flow Sheet for this information)     Communication ability:  Patient appears to be able to clearly communicate and understand verbal and written communication and follow directions correctly.  Treatment Explanation - The following has been discussed with the patient:   RX ordered/plan of care  Anticipated outcomes  Possible risks and side effects  This patient would benefit from PT intervention to resume normal activities.   Rehab potential is fair.    Frequency:  2 X week, once daily  Duration:  for 3 weeks  Discharge Plan:  Achieve all LTG.  Independent in home treatment program.  Reach maximal therapeutic benefit.    Please refer to the daily flowsheet for treatment today, total treatment time and time spent performing 1:1 timed codes.

## 2017-12-20 NOTE — MR AVS SNAPSHOT
After Visit Summary   12/20/2017    Kip Winn    MRN: [id number]           Patient Information     Date Of Birth          1965        Visit Information        Provider Department      12/20/2017 9:20 AM Chano Sotelo PT [facility] [place] [facility]        Today's Diagnoses     Herniated nucleus pulposus, L4-5 right    -  1    Lumbar radiculopathy           Follow-ups after your visit        Your next 10 appointments already scheduled     Dec 28, 2017  7:40 AM CST   MARY Spine with Chano Sotelo PT   [facility] [place] [place] [facility] ([facility] [place])    [address] [place] [address]  [address]  [place] [address] [phone]              Who to contact     If you have questions or need follow up information about today's clinic visit or your schedule please contact [facility] PHYSICAL[facility]AP[facility] directly at [phone].  Normal or non-critical lab and imaging results will be communicated to you by [employer], letter or phone within 4 business days after the clinic has received the results. If you do not hear from us within 7 days, please contact the clinic through [email or web address] or phone. If you have a critical or abnormal lab result, we will notify you by phone as soon as possible.  Submit refill requests through Light Harmonic or call your pharmacy and they will forward the refill request to us. Please allow 3 business days for your refill to be completed.          Additional Information About Your Visit        MyChart Information     Light [employer] gives you secure access to your electronic health record. If you see a primary care provider, you can also send messages to your care team and make appointments. If you have questions, please call your primary care clinic.  If you do not have a primary care provider, please call [phone] and they will assist you.        Care EveryWhere ID     This is your Care EveryWhere ID.  This could be used by other organizations to access your [place] medical records  [id number]         Blood Pressure from Last 3 Encounters:   11/21/17 134/80   10/07/16 112/64   10/06/15 104/60    Weight from Last 3 Encounters:   12/13/17 81.1 kg (178 lb 12.8 oz)   11/21/17 84.4 kg (186 lb)   10/07/16 89.4 kg (197 lb)              We Performed the Following     HC PT EVAL, LOW COMPLEXITY     MARY INITIAL EVAL REPORT     THERAPEUTIC EXERCISES        Primary Care Provider Office Phone # Fax #    Dany Richardson [phone]       [address]  [place] [address]        Equal Access to Services     [facility] GAYATHRI : Hadii aad ku hadasho Soomaali, waaxda luqadaha, qaybta kaalmada adeegyada, waxay idiin haynahomi chicas . So [facility] [phone].    ATENCIÓN: Si habla español, tiene a capps disposición servicios gratuitos de asistencia lingüística. [facility] [phone].    We comply with applicable federal civil rights laws and [place] laws. We do not discriminate on the basis of race, color, national origin, age, disability, sex, sexual orientation, or gender identity.            Thank you!     Thank you for [facility] [place] [facility]  for your care. Our goal is always to provide you with excellent care. Hearing back from our patients is one way we can continue to improve our services. Please take a few minutes to complete the written survey that you may receive in the mail after your visit with us. Thank you!             Your Updated Medication List - Protect others around you: Learn how to safely use, store and throw away your medicines at www.disposemymeds.org.          This list is accurate as of: 12/20/17 11:31 AM.  Always use your most recent med list.                   Brand Name Dispense Instructions for use Diagnosis    aspirin 81 MG tablet     100    ONE DAILY        methylPREDNISolone 4 MG tablet    MEDROL DOSEPAK    21 tablet     Follow package instructions    Low back pain with sciatica, sciatica laterality unspecified, unspecified back pain laterality, unspecified chronicity       Multi-vitamin Tabs tablet   Generic drug:  multivitamin, therapeutic with minerals      1 TABLET DAILY        traMADol 50 MG tablet    ULTRAM    10 tablet    Take 1-2 tablets ( mg) by mouth every 8 hours as needed for pain    Low back pain with sciatica, sciatica laterality unspecified, unspecified back pain laterality, unspecified chronicity       VITAMIN C PO      Take 1 tablet by mouth.        VITAMIN D PO      Take 1 tablet by mouth.

## 2017-12-22 NOTE — PROGRESS NOTES
[facility] Evaluation  Subjective:    Patient is a 52 year old male presenting with rehab left ankle/foot hpi.                                      Pertinent medical history includes:  Smoking.        Current occupation is Logistics.                  Oswestry Score: 30 %                 Objective:    System    Physical Exam    General     ROS    Assessment/Plan:

## 2017-12-28 ENCOUNTER — HOSPITAL ENCOUNTER (OUTPATIENT)
Facility: CLINIC | Age: 52
Discharge: HOME | End: 2017-12-28
Attending: RADIOLOGY | Admitting: RADIOLOGY
Payer: COMMERCIAL

## 2017-12-28 ENCOUNTER — HOSPITAL ENCOUNTER (OUTPATIENT)
Dept: GENERAL RADIOLOGY | Facility: CLINIC | Age: 52
End: 2017-12-28
Attending: NURSE PRACTITIONER | Admitting: RADIOLOGY
Payer: COMMERCIAL

## 2017-12-28 VITALS
WEIGHT: 185 LBS | HEIGHT: 73 IN | RESPIRATION RATE: 18 BRPM | DIASTOLIC BLOOD PRESSURE: 81 MMHG | OXYGEN SATURATION: 99 % | HEART RATE: 85 BPM | BODY MASS INDEX: 24.52 KG/M2 | TEMPERATURE: 97.8 F | SYSTOLIC BLOOD PRESSURE: 138 MMHG

## 2017-12-28 DIAGNOSIS — M54.16 LUMBAR RADICULOPATHY: ICD-10-CM

## 2017-12-28 PROCEDURE — 64483 NJX AA&/STRD TFRM EPI L/S 1: CPT

## 2017-12-28 PROCEDURE — 40000863 ZZH STATISTIC RADIOLOGY XRAY, US, CT, MAR, NM

## 2017-12-28 PROCEDURE — 25000125 ZZHC RX 250: Performed by: NURSE PRACTITIONER

## 2017-12-28 PROCEDURE — 25000128 H RX IP 250 OP 636: Performed by: NURSE PRACTITIONER

## 2017-12-28 PROCEDURE — 25500064 ZZH RX 255 OP 636: Performed by: NURSE PRACTITIONER

## 2017-12-28 RX ORDER — DEXAMETHASONE SODIUM PHOSPHATE 10 MG/ML
20 INJECTION, SOLUTION INTRAMUSCULAR; INTRAVENOUS ONCE
Status: COMPLETED | OUTPATIENT
Start: 2017-12-28 | End: 2017-12-28

## 2017-12-28 RX ORDER — IOPAMIDOL 408 MG/ML
10 INJECTION, SOLUTION INTRATHECAL ONCE
Status: COMPLETED | OUTPATIENT
Start: 2017-12-28 | End: 2017-12-28

## 2017-12-28 RX ADMIN — DEXAMETHASONE SODIUM PHOSPHATE 20 MG: 10 INJECTION, SOLUTION INTRAMUSCULAR; INTRAVENOUS at 09:39

## 2017-12-28 RX ADMIN — IOPAMIDOL: 408 INJECTION, SOLUTION INTRATHECAL at 09:36

## 2017-12-28 RX ADMIN — LIDOCAINE HYDROCHLORIDE 2.5 ML: 10 INJECTION, SOLUTION EPIDURAL; INFILTRATION; INTRACAUDAL; PERINEURAL at 09:36

## 2017-12-28 RX ADMIN — LIDOCAINE HYDROCHLORIDE 3 ML: 10 INJECTION, SOLUTION EPIDURAL; INFILTRATION; INTRACAUDAL; PERINEURAL at 09:39

## 2017-12-28 NOTE — PROGRESS NOTES
RADIOLOGY PROCEDURE NOTE  Patient name: Kip Winn  MRN: [id number]  : 1965    Pre-procedure diagnosis: Low back pain  Post-procedure diagnosis: Same    Procedure Date/Time: 2017  9:47 AM  Procedure: Right L4-5 Transforaminal SMOOTH  Estimated blood loss: None  Specimen(s) collected with description: none  The patient tolerated the procedure well with no immediate complications.  Significant findings:none    See imaging dictation for procedural details.    Provider name: Jesus Pruitt  Assistant(s):None

## 2017-12-28 NOTE — IP AVS SNAPSHOT
MRN:[id number]                      After Visit Summary   12/28/2017    Kip Winn    MRN: [id number]           Visit Information        Department      12/28/2017  8:37 AM [facility]          Review of your medicines      UNREVIEWED medicines. Ask your doctor about these medicines        Dose / Directions    aspirin 81 MG tablet        ONE DAILY   Quantity:  100   Refills:  3       methylPREDNISolone 4 MG tablet   Commonly known as:  MEDROL DOSEPAK   Used for:  Low back pain with sciatica, sciatica laterality unspecified, unspecified back pain laterality, unspecified chronicity        Follow package instructions   Quantity:  21 tablet   Refills:  0       Multi-vitamin Tabs tablet   Generic drug:  multivitamin, therapeutic with minerals        1 TABLET DAILY   Refills:  0       traMADol 50 MG tablet   Commonly known as:  ULTRAM   Used for:  Low back pain with sciatica, sciatica laterality unspecified, unspecified back pain laterality, unspecified chronicity        Dose:   mg   Take 1-2 tablets ( mg) by mouth every 8 hours as needed for pain   Quantity:  10 tablet   Refills:  0       VITAMIN C PO        Dose:  1 tablet   Take 1 tablet by mouth.   Refills:  0       VITAMIN D PO        Dose:  1 tablet   Take 1 tablet by mouth.   Refills:  0                Protect others around you: Learn how to safely use, store and throw away your medicines at www.disposemymeds.org.         Follow-ups after your visit        Your next 10 appointments already scheduled     Jan 02, 2018  1:20 PM CST   [facility] Spine with Matt Saini PT   [facility] [place] [facility] ([facility] [place])    [address] [place] [address]  [address]  [place] [address] [phone]               Care Instructions        Further instructions from your care team       Steroid Injection Discharge Instructions     After you go home:      You may resume your normal diet.    Care  of Puncture Site:      If you have a bandaid on your puncture site, you may remove it the next morning    You may shower tomorrow    No bath tubs, whirlpools or swimming for at least 3 days     Activity:      You may go back to normal activity in 24 hours    You should let pain be your guide as to the extent of your activities    Maintain any activity limitations as ordered by your provider    Do NOT drive a vehicle until tomorrow    Medicines:      You may resume all medications    Resume your Warfarin/Coumadin at your regular dose today. Follow up with your provider to have your INR rechecked    Resume your Platelet Inhibitors and Aspirin tomorrow at your regular dose    For minor pain, you may take Acetaminophen (Tylenol) or Ibuprofen (Advil)    Pain:       You may experience increased or different pain over the next 24-48 hours    For the next 48 hrs - you may use ice packs for discomfort     Call your primary care doctor if:      You have severe pain that does not improve with pain medication    You have chills or a fever greater than 101 F (38 C)    The site is red, swollen, hot or tender    New problems with your bowel or bladder    Any questions or concerns    Other Instructions:      New numbness down your leg post injection is temporary and may last for up to 6 hours. You may need assistance with activity until your leg has normal sensation.    If you are diabetic, monitor your blood sugar closely. Contact the provider who manages your diabetes to help you control your blood sugar if needed.    For Your Information:      A steroid was injected to help decrease swelling and may help to reduce pain. It may take up to 7-10 days to obtain full results.    Some patients will get lasting relief from a single injection. Others may require up to 3 injections to get results. If you have more than one steroid injection, they should be given 2 weeks apart.    Side effects of your steroid injection are mild and will  "go away in 2-3 days  - Insomnia  - Heartburn  - Flushed face  - Water retention  - Increased appetite  - Increased blood sugar      If you have questions call:        [facility] Radiology Dept @ [phone]      The provider who performed your procedure was _________________.         Additional Information About Your Visit        MyChart Information     [employer] gives you secure access to your electronic health record. If you see a primary care provider, you can also send messages to your care team and make appointments. If you have questions, please call your primary care clinic.  If you do not have a primary care provider, please call [phone] and they will assist you.        Care EveryWhere ID     This is your Care EveryWhere ID. This could be used by other organizations to access your [facility] medical records  [id number]        Your Vitals Were     Blood Pressure Pulse Temperature Respirations Height Weight    148/79 (BP Location: Left arm) 89 97.8  F (36.6  C) (Oral) 18 1.854 m (6' 1\") 83.9 kg (185 lb)    Pulse Oximetry BMI (Body Mass Index)                99% 24.41 kg/m2           Primary Care Provider Office Phone # Fax #    Dany Richardson [phone]      Equal Access to Services     [facility] AH: Hadii marisol ku hadasho Soomaali, waaxda luqadaha, qaybta kaalmada adeegyada, kika christine. So [facility] [phone].    ATENCIÓN: Si habla español, tiene a capps disposición servicios gratuitos de asistencia lingüística. Llame al [phone].    We comply with applicable federal civil rights laws and [place] laws. We do not discriminate on the basis of race, color, national origin, age, disability, sex, sexual orientation, or gender identity.            Thank you!     Thank you for choosing [facility] for your care. Our goal is always to provide you with excellent care. Hearing back from our patients is one way we can continue to improve our services. Please take a " few minutes to complete the written survey that you may receive in the mail after you visit with us. Thank you!             Medication List: This is a list of all your medications and when to take them. Check marks below indicate your daily home schedule. Keep this list as a reference.      Medications           Morning Afternoon Evening Bedtime As Needed    aspirin 81 MG tablet   ONE DAILY                                methylPREDNISolone 4 MG tablet   Commonly known as:  MEDROL DOSEPAK   Follow package instructions                                Multi-vitamin Tabs tablet   1 TABLET DAILY   Generic drug:  multivitamin, therapeutic with minerals                                traMADol 50 MG tablet   Commonly known as:  ULTRAM   Take 1-2 tablets ( mg) by mouth every 8 hours as needed for pain                                VITAMIN C PO   Take 1 tablet by mouth.                                VITAMIN D PO   Take 1 tablet by mouth.

## 2017-12-28 NOTE — DISCHARGE INSTRUCTIONS
Steroid Injection Discharge Instructions     After you go home:      You may resume your normal diet.    Care of Puncture Site:      If you have a bandaid on your puncture site, you may remove it the next morning    You may shower tomorrow    No bath tubs, whirlpools or swimming for at least 3 days     Activity:      You may go back to normal activity in 24 hours    You should let pain be your guide as to the extent of your activities    Maintain any activity limitations as ordered by your provider    Do NOT drive a vehicle until tomorrow    Medicines:      You may resume all medications    Resume your Warfarin/Coumadin at your regular dose today. Follow up with your provider to have your INR rechecked    Resume your Platelet Inhibitors and Aspirin tomorrow at your regular dose    For minor pain, you may take Acetaminophen (Tylenol) or Ibuprofen (Advil)    Pain:       You may experience increased or different pain over the next 24-48 hours    For the next 48 hrs - you may use ice packs for discomfort     Call your primary care doctor if:      You have severe pain that does not improve with pain medication    You have chills or a fever greater than 101 F (38 C)    The site is red, swollen, hot or tender    New problems with your bowel or bladder    Any questions or concerns    Other Instructions:      New numbness down your leg post injection is temporary and may last for up to 6 hours. You may need assistance with activity until your leg has normal sensation.    If you are diabetic, monitor your blood sugar closely. Contact the provider who manages your diabetes to help you control your blood sugar if needed.    For Your Information:      A steroid was injected to help decrease swelling and may help to reduce pain. It may take up to 7-10 days to obtain full results.    Some patients will get lasting relief from a single injection. Others may require up to 3 injections to get results. If you have more than one  steroid injection, they should be given 2 weeks apart.    Side effects of your steroid injection are mild and will go away in 2-3 days  - Insomnia  - Heartburn  - Flushed face  - Water retention  - Increased appetite  - Increased blood sugar      If you have questions call:        [facility] Radiology Dept @ [phone]      The provider who performed your procedure was _________________.

## 2017-12-28 NOTE — IP AVS SNAPSHOT
[place] [address]    [place] [address]    Phone:  [phone]                                       After Visit Summary   12/28/2017    Kip Winn    MRN: [id number]           After Visit Summary Signature Page     I have received my discharge instructions, and my questions have been answered. I have discussed any challenges I see with this plan with the nurse or doctor.    ..........................................................................................................................................  Patient/Patient Representative Signature      ..........................................................................................................................................  Patient Representative Print Name and Relationship to Patient    ..................................................               ................................................  Date                                            Time    ..........................................................................................................................................  Reviewed by Signature/Title    ...................................................              ..............................................  Date                                                            Time

## 2018-01-02 ENCOUNTER — THERAPY VISIT (OUTPATIENT)
Dept: PHYSICAL THERAPY | Facility: CLINIC | Age: 53
End: 2018-01-02
Payer: COMMERCIAL

## 2018-01-02 DIAGNOSIS — M54.16 LUMBAR RADICULOPATHY: ICD-10-CM

## 2018-01-02 DIAGNOSIS — M51.26 HERNIATED NUCLEUS PULPOSUS, L4-5 RIGHT: ICD-10-CM

## 2018-01-02 PROCEDURE — 97110 THERAPEUTIC EXERCISES: CPT | Mod: GP | Performed by: PHYSICAL THERAPIST

## 2018-01-02 PROCEDURE — 97012 MECHANICAL TRACTION THERAPY: CPT | Mod: GP | Performed by: PHYSICAL THERAPIST

## 2018-01-02 PROCEDURE — 97140 MANUAL THERAPY 1/> REGIONS: CPT | Mod: GP | Performed by: PHYSICAL THERAPIST

## 2018-02-09 ENCOUNTER — OFFICE VISIT (OUTPATIENT)
Dept: FAMILY MEDICINE | Facility: CLINIC | Age: 53
End: 2018-02-09
Payer: COMMERCIAL

## 2018-02-09 VITALS
SYSTOLIC BLOOD PRESSURE: 104 MMHG | HEART RATE: 82 BPM | BODY MASS INDEX: 23.59 KG/M2 | OXYGEN SATURATION: 98 % | DIASTOLIC BLOOD PRESSURE: 70 MMHG | HEIGHT: 73 IN | WEIGHT: 178 LBS | TEMPERATURE: 96.9 F

## 2018-02-09 DIAGNOSIS — Z12.11 SCREEN FOR COLON CANCER: Primary | ICD-10-CM

## 2018-02-09 DIAGNOSIS — M54.16 LUMBAR RADICULOPATHY: ICD-10-CM

## 2018-02-09 DIAGNOSIS — Z80.42 FAMILY HISTORY OF PROSTATE CANCER: ICD-10-CM

## 2018-02-09 DIAGNOSIS — Z00.00 ANNUAL PHYSICAL EXAM: ICD-10-CM

## 2018-02-09 DIAGNOSIS — Z13.6 CARDIOVASCULAR SCREENING; LDL GOAL LESS THAN 130: ICD-10-CM

## 2018-02-09 LAB
ALBUMIN SERPL-MCNC: 3.9 G/DL (ref 3.4–5)
ALP SERPL-CCNC: 65 U/L (ref 40–150)
ALT SERPL W P-5'-P-CCNC: 19 U/L (ref 0–70)
ANION GAP SERPL CALCULATED.3IONS-SCNC: 9 MMOL/L (ref 3–14)
AST SERPL W P-5'-P-CCNC: 16 U/L (ref 0–45)
BILIRUB SERPL-MCNC: 0.4 MG/DL (ref 0.2–1.3)
BUN SERPL-MCNC: 17 MG/DL (ref 7–30)
CALCIUM SERPL-MCNC: 9 MG/DL (ref 8.5–10.1)
CHLORIDE SERPL-SCNC: 108 MMOL/L (ref 94–109)
CHOLEST SERPL-MCNC: 137 MG/DL
CO2 SERPL-SCNC: 27 MMOL/L (ref 20–32)
CREAT SERPL-MCNC: 0.77 MG/DL (ref 0.66–1.25)
GFR SERPL CREATININE-BSD FRML MDRD: >90 ML/MIN/1.7M2
GLUCOSE SERPL-MCNC: 99 MG/DL (ref 70–99)
HDLC SERPL-MCNC: 48 MG/DL
LDLC SERPL CALC-MCNC: 67 MG/DL
NONHDLC SERPL-MCNC: 89 MG/DL
POTASSIUM SERPL-SCNC: 4.2 MMOL/L (ref 3.4–5.3)
PROT SERPL-MCNC: 6.8 G/DL (ref 6.8–8.8)
PSA SERPL-ACNC: 0.91 UG/L (ref 0–4)
SODIUM SERPL-SCNC: 144 MMOL/L (ref 133–144)
TRIGL SERPL-MCNC: 110 MG/DL

## 2018-02-09 PROCEDURE — 80053 COMPREHEN METABOLIC PANEL: CPT | Performed by: FAMILY MEDICINE

## 2018-02-09 PROCEDURE — 99396 PREV VISIT EST AGE 40-64: CPT | Performed by: FAMILY MEDICINE

## 2018-02-09 PROCEDURE — 36415 COLL VENOUS BLD VENIPUNCTURE: CPT | Performed by: FAMILY MEDICINE

## 2018-02-09 PROCEDURE — G0103 PSA SCREENING: HCPCS | Performed by: FAMILY MEDICINE

## 2018-02-09 PROCEDURE — 80061 LIPID PANEL: CPT | Performed by: FAMILY MEDICINE

## 2018-02-09 NOTE — MR AVS SNAPSHOT
After Visit Summary   2/9/2018    Kip Winn    MRN: [id number]           Patient Information     Date Of Birth          1965        Visit Information        Provider Department      2/9/2018 8:40 AM Dany Richardson MD [facility] [place]        Today's Diagnoses     Screen for colon cancer    -  1    Family history of prostate cancer        CARDIOVASCULAR SCREENING; LDL GOAL LESS THAN 130        Lumbar radiculopathy        Annual physical exam           Follow-ups after your visit        Additional Services     GASTROENTEROLOGY ADULT REF PROCEDURE ONLY Aimee Swenson [phone]; Dr. CIERRA Arauz       Last Lab Result: Creatinine (mg/dL)       Date                     Value                 10/07/2016               0.77             ----------  Body mass index is 23.29 kg/(m^2).     Needed:  No  Language:  English    Patient will be contacted to schedule procedure.     Please be aware that coverage of these services is subject to the terms and limitations of your health insurance plan.  Call member services at your health plan with any benefit or coverage questions.  Any procedures must be performed at a [facility] facility OR coordinated by your clinic's referral office.    Please bring the following with you to your appointment:    (1) Any X-Rays, CTs or MRIs which have been performed.  Contact the facility where they were done to arrange for  prior to your scheduled appointment.    (2) List of current medications   (3) This referral request   (4) Any documents/labs given to you for this referral                  Follow-up notes from your care team     Return in about 1 year (around 2/9/2019) for Physical Exam.      Who to contact     If you have questions or need follow up information about today's clinic visit or your schedule please contact [facility] [place] directly at [phone].  Normal or non-critical lab and imaging results will be communicated  "to you by My[employer]ikeshart, letter or phone within 4 business days after the clinic has received the results. If you do not hear from us within 7 days, please contact the clinic through [employer] or phone. If you have a critical or abnormal lab result, we will notify you by phone as soon as possible.  Submit refill requests through [employer] or call your pharmacy and they will forward the refill request to us. Please allow 3 business days for your refill to be completed.          Additional Information About Your Visit        [employer] Information     [employer] gives you secure access to your electronic health record. If you see a primary care provider, you can also send messages to your care team and make appointments. If you have questions, please call your primary care clinic.  If you do not have a primary care provider, please call [phone] and they will assist you.        Care EveryWhere ID     This is your Care EveryWhere ID. This could be used by other organizations to access your [facility] medical records  [id number]        Your Vitals Were     Pulse Temperature Height Pulse Oximetry BMI (Body Mass Index)       82 96.9  F (36.1  C) (Tympanic) 6' 1.31\" (1.862 m) 98% 23.29 kg/m2        Blood Pressure from Last 3 Encounters:   02/09/18 104/70   12/28/17 138/81   11/21/17 134/80    Weight from Last 3 Encounters:   02/09/18 178 lb (80.7 kg)   12/28/17 185 lb (83.9 kg)   12/13/17 178 lb 12.8 oz (81.1 kg)              We Performed the Following     Comprehensive metabolic panel     GASTROENTEROLOGY ADULT REF PROCEDURE ONLY Aimee Swenson [phone]; Dr. CIERRA Arauz     Lipid panel reflex to direct LDL Fasting     PSA, screen          Today's Medication Changes          These changes are accurate as of 2/9/18  9:03 AM.  If you have any questions, ask your nurse or doctor.               Stop taking these medicines if you haven't already. Please contact your care team if you have questions.     methylPREDNISolone 4 MG " tablet   Commonly known as:  MEDROL DOSEPAK   Stopped by:  Dany Richardson MD           traMADol 50 MG tablet   Commonly known as:  ULTRAM   Stopped by:  Dany Richardson MD                    Primary Care Provider Office Phone # Fax #    Dany Richardson [phone]       [address]  [place] [address]        Equal Access to Services     [facility] AH: Hadii aad ku hadasho Soomaali, waaxda luqadaha, qaybta kaalmada adeegyada, waxay idiin hayaan adeeg kharash la'aan ah. So [facility] [phone].    ATENCIÓN: Si habla español, tiene a capps disposición servicios gratuitos de asistencia lingüística. Llame al [phone].    We comply with applicable federal civil rights laws and [place] laws. We do not discriminate on the basis of race, color, national origin, age, disability, sex, sexual orientation, or gender identity.            Thank you!     Thank you for choosing [facility] [place]  for your care. Our goal is always to provide you with excellent care. Hearing back from our patients is one way we can continue to improve our services. Please take a few minutes to complete the written survey that you may receive in the mail after your visit with us. Thank you!             Your Updated Medication List - Protect others around you: Learn how to safely use, store and throw away your medicines at www.disposemymeds.org.          This list is accurate as of 2/9/18  9:03 AM.  Always use your most recent med list.                   Brand Name Dispense Instructions for use Diagnosis    aspirin 81 MG tablet     100    ONE DAILY        Multi-vitamin Tabs tablet   Generic drug:  multivitamin, therapeutic with minerals      1 TABLET DAILY        VITAMIN C PO      Take 1 tablet by mouth.        VITAMIN D PO      Take 1 tablet by mouth.

## 2018-02-09 NOTE — PROGRESS NOTES
SUBJECTIVE:   CC: Kip Winn is an 52 year old male who presents for preventative health visit.     Physical   Annual:     Getting at least 3 servings of Calcium per day::  Yes    Bi-annual eye exam::  Yes    Dental care twice a year::  Yes    Sleep apnea or symptoms of sleep apnea::  None    Diet::  Regular (no restrictions)    Frequency of exercise::  None    Taking medications regularly::  Yes    Medication side effects::  Not applicable    Additional concerns today::  No                Patient has issues with lumbar discomfort.  He had injection done in that region.  Since then he is feeling better.  Overall he is feeling healthy.  He continues to smoke.  He is planning to quit but he is not sure at this time.    Today's PHQ-2 Score:   PHQ-2 ( 1999 [employer]) 2/5/2018   Q1: Little interest or pleasure in doing things 0   Q2: Feeling down, depressed or hopeless 0   PHQ-2 Score 0   Q1: Little interest or pleasure in doing things Not at all   Q2: Feeling down, depressed or hopeless Not at all   PHQ-2 Score 0       Abuse: Current or Past(Physical, Sexual or Emotional)- No  Do you feel safe in your environment - Yes    Social History   Substance Use Topics     Smoking status: Current Every Day Smoker     Packs/day: 1.00     Years: 20.00     Types: Cigarettes     Smokeless tobacco: Never Used     Alcohol use 0.0 oz/week     0 Standard drinks or equivalent per week      Comment: 2 drinks a month     Alcohol Use 2/5/2018   If you drink alcohol, do you typically have greater than 3 drinks per day OR greater than 7 drinks per week?   No       Last PSA:   PSA   Date Value Ref Range Status   10/07/2016 0.84 0 - 4 ug/L Final     Comment:     Assay Method:  Chemiluminescence using Siemens Vista analyzer       Reviewed orders with patient. Reviewed health maintenance and updated orders accordingly - Yes      Reviewed and updated as needed this visit by clinical staff  Tobacco  Allergies  Meds  Fam Hx  Soc Hx     "    Reviewed and updated as needed this visit by Provider            Review of Systems  C: NEGATIVE for fever, chills, change in weight  I: NEGATIVE for worrisome rashes, moles or lesions  E: NEGATIVE for vision changes or irritation  ENT: NEGATIVE for ear, mouth and throat problems  R: NEGATIVE for significant cough or SOB  CV: NEGATIVE for chest pain, palpitations or peripheral edema  GI: NEGATIVE for nausea, abdominal pain, heartburn, or change in bowel habits   male: negative for dysuria, hematuria, decreased urinary stream, erectile dysfunction, urethral discharge  M: NEGATIVE for significant arthralgias or myalgia  N: NEGATIVE for weakness, dizziness or paresthesias  P: NEGATIVE for changes in mood or affect    OBJECTIVE:   /70  Pulse 82  Temp 96.9  F (36.1  C) (Tympanic)  Ht 6' 1.31\" (1.862 m)  Wt 178 lb (80.7 kg)  SpO2 98%  BMI 23.29 kg/m2    Physical Exam  GENERAL: healthy, alert and no distress  EYES: Eyes grossly normal to inspection, PERRL and conjunctivae and sclerae normal  HENT: ear canals and TM's normal, nose and mouth without ulcers or lesions  NECK: no adenopathy, no asymmetry, masses, or scars and thyroid normal to palpation  RESP: lungs clear to auscultation - no rales, rhonchi or wheezes  CV: regular rate and rhythm, normal S1 S2, no S3 or S4, no murmur, click or rub, no peripheral edema and peripheral pulses strong  ABDOMEN: soft, nontender, no hepatosplenomegaly, no masses and bowel sounds normal  MS: no gross musculoskeletal defects noted, no edema  SKIN: no suspicious lesions or rashes  NEURO: Normal strength and tone, mentation intact and speech normal  PSYCH: mentation appears normal, affect normal/bright    ASSESSMENT/PLAN:   1. Screen for colon cancer    - GASTROENTEROLOGY ADULT REF PROCEDURE ONLY Aimee Swenson [phone]; Dr. CIERRA Arauz    2. Family history of prostate cancer    - PSA, screen    3. CARDIOVASCULAR SCREENING; LDL GOAL LESS THAN 130    - Lipid panel " "reflex to direct LDL Fasting  - Comprehensive metabolic panel    4. Lumbar radiculopathy      5. Annual physical exam  Labs ordered.  Once done we will follow-up on that  - GASTROENTEROLOGY ADULT REF PROCEDURE ONLY Aimee Swenson [phone]; Dr. CIERRA Arauz  - Lipid panel reflex to direct LDL Fasting  - Comprehensive metabolic panel  - PSA, screen    COUNSELING:   Reviewed preventive health counseling, as reflected in patient instructions       Regular exercise       Healthy diet/nutrition         reports that he has been smoking Cigarettes.  He has a 20.00 pack-year smoking history. He has never used smokeless tobacco.    Estimated body mass index is 23.29 kg/(m^2) as calculated from the following:    Height as of this encounter: 6' 1.31\" (1.862 m).    Weight as of this encounter: 178 lb (80.7 kg).       Counseling Resources:  ATP IV Guidelines  Pooled Cohorts Equation Calculator  FRAX Risk Assessment  ICSI Preventive Guidelines  Dietary Guidelines for Americans, 2010  USDA's MyPlate  ASA Prophylaxis  Lung CA Screening    Dany Richardson MD  [facility]  "

## 2018-02-09 NOTE — NURSING NOTE
"Chief Complaint   Patient presents with     Physical     Fasting        Initial /70  Pulse 82  Temp 96.9  F (36.1  C) (Tympanic)  Ht 6' 1.31\" (1.862 m)  Wt 178 lb (80.7 kg)  SpO2 98%  BMI 23.29 kg/m2 Estimated body mass index is 23.29 kg/(m^2) as calculated from the following:    Height as of this encounter: 6' 1.31\" (1.862 m).    Weight as of this encounter: 178 lb (80.7 kg).  Medication Reconciliation: complete    Current Outpatient Prescriptions   Medication Sig Dispense Refill     Ascorbic Acid (VITAMIN C PO) Take 1 tablet by mouth.       VITAMIN D PO Take 1 tablet by mouth.       ASPIRIN 81 MG OR TABS ONE DAILY 100 3     MULTI-VITAMIN OR TABS 1 TABLET DAILY       traMADol (ULTRAM) 50 MG tablet Take 1-2 tablets ( mg) by mouth every 8 hours as needed for pain (Patient not taking: Reported on 2/9/2018) 10 tablet 0     methylPREDNISolone (MEDROL DOSEPAK) 4 MG tablet Follow package instructions (Patient not taking: Reported on 2/9/2018) 21 tablet 0       Daniel PRESSLEY CMA  "

## 2018-03-19 ENCOUNTER — SURGERY (OUTPATIENT)
Age: 53
End: 2018-03-19

## 2018-03-19 ENCOUNTER — HOSPITAL ENCOUNTER (OUTPATIENT)
Facility: CLINIC | Age: 53
Discharge: HOME | End: 2018-03-19
Attending: COLON & RECTAL SURGERY | Admitting: COLON & RECTAL SURGERY
Payer: COMMERCIAL

## 2018-03-19 VITALS
RESPIRATION RATE: 17 BRPM | OXYGEN SATURATION: 96 % | SYSTOLIC BLOOD PRESSURE: 130 MMHG | HEART RATE: 91 BPM | WEIGHT: 185 LBS | DIASTOLIC BLOOD PRESSURE: 85 MMHG | BODY MASS INDEX: 24.52 KG/M2 | HEIGHT: 73 IN

## 2018-03-19 LAB — COLONOSCOPY: NORMAL

## 2018-03-19 PROCEDURE — 45378 DIAGNOSTIC COLONOSCOPY: CPT | Performed by: COLON & RECTAL SURGERY

## 2018-03-19 PROCEDURE — G0121 COLON CA SCRN NOT HI RSK IND: HCPCS | Performed by: COLON & RECTAL SURGERY

## 2018-03-19 PROCEDURE — 25000128 H RX IP 250 OP 636: Performed by: COLON & RECTAL SURGERY

## 2018-03-19 PROCEDURE — G0500 MOD SEDAT ENDO SERVICE >5YRS: HCPCS | Performed by: COLON & RECTAL SURGERY

## 2018-03-19 RX ORDER — FENTANYL CITRATE 50 UG/ML
INJECTION, SOLUTION INTRAMUSCULAR; INTRAVENOUS PRN
Status: DISCONTINUED | OUTPATIENT
Start: 2018-03-19 | End: 2018-03-19 | Stop reason: HOSPADM

## 2018-03-19 RX ORDER — LIDOCAINE 40 MG/G
CREAM TOPICAL
Status: DISCONTINUED | OUTPATIENT
Start: 2018-03-19 | End: 2018-03-19 | Stop reason: HOSPADM

## 2018-03-19 RX ORDER — ONDANSETRON 2 MG/ML
4 INJECTION INTRAMUSCULAR; INTRAVENOUS
Status: DISCONTINUED | OUTPATIENT
Start: 2018-03-19 | End: 2018-03-19 | Stop reason: HOSPADM

## 2018-03-19 RX ADMIN — FENTANYL CITRATE 100 MCG: 50 INJECTION, SOLUTION INTRAMUSCULAR; INTRAVENOUS at 07:33

## 2018-03-19 RX ADMIN — MIDAZOLAM 2 MG: 1 INJECTION INTRAMUSCULAR; INTRAVENOUS at 07:34

## 2018-03-19 NOTE — H&P
Colon & Rectal Surgery History and Physical  Pre-Endoscopy Procedure Note    History of Present Illness   I have been asked by Dr. Richardson to evaluate this 52 year old male for colorectal cancer screening. He denies any abdominal pain, weight loss, bleeding per rectum, or recent change in bowel habits.    Past Medical History  Diagnosis Date     Coronary artery disease      Fatigue 7/1/2011     Lumbar disc herniation     L4-5     Smoker 7/1/2011       Past Surgical History  Procedure Laterality Date     EYE SURGERY      Lasik 5 years ago        Medications  Medication Sig     Ascorbic Acid (VITAMIN C PO) Take 1 tablet by mouth.     VITAMIN D PO Take 1 tablet by mouth.     ASPIRIN 81 MG OR TABS ONE DAILY     MULTI-VITAMIN OR TABS 1 TABLET DAILY       Allergies  Allergen Reactions     Penicillins      Throat felt like it was closing and bloody nose        Family History   Family history includes C.A.D. in his maternal grandfather; DIABETES in his mother; Hypertension in his maternal grandmother and mother; Prostate Cancer (age of onset: 61) in his father.     Social History   He reports that he has been smoking Cigarettes.  He has a 20.00 pack-year smoking history. He has never used smokeless tobacco. He reports that he drinks alcohol. He reports that he does not use illicit drugs.      Review of Systems   Constitutional:  No fever, weight change or fatigue.    Eyes:     No dry eyes or vision changes.   Ears/Nose/Throat/Neck:  No oral ulcers, sore throat or voice change.    Cardiovascular:   No palpitations, syncope, angina or edema.   Respiratory:    No chest pain, excessive sleepiness, shortness of breath or hemoptysis.    Gastrointestinal:   No abdominal pain, nausea, vomiting, diarrhea or heartburn.    Genitourinary:   No dysuria, hematuria, urinary retention or urinary frequency.   Musculoskeletal:  No joint swelling or arthralgias.    Dermatologic:  No skin rash or other skin changes.   Neurologic:    No focal  "weakness or numbness. No neuropathy.   Psychiatric:    No depression, anxiety, suicidal ideation, or paranoid ideation.   Endocrine:   No cold or heat intolerance, polydipsia, hirsutism, change in libido, or flushing.   Hematology/Lymphatic:  No bleeding or lymphadenopathy.    Allergy/Immunology:  No rhinitis or hives.     Physical Exam   Vitals:  /87, HR 91, RR 18, height 1.854 m (6' 1\"), weight 83.9 kg (185 lb), SpO2 97 %.    General:  Alert and oriented to person, place and time   Airway: Normal oropharyngeal airway and neck mobility   Lungs:  Clear bilaterally   Heart:  Regular rate and rhythm   Abdomen: Soft, NT, ND, no masses   Rectal:  Perianal skin without excoriation, hemorrhoidal disease or anal fissure        Digital rectal examination reveals normal sphincter tone without masses    ASA Grade: II (mild systemic disease)    Impression: Cleared for use of conscious sedation for colorectal cancer screening    Plan: Proceed with colonoscopy     Mya Arauz MD  [facility]  [phone]  "

## 2018-09-17 ENCOUNTER — TELEPHONE (OUTPATIENT)
Dept: NEUROSURGERY | Facility: CLINIC | Age: 53
End: 2018-09-17

## 2018-09-18 ENCOUNTER — OFFICE VISIT (OUTPATIENT)
Dept: NEUROSURGERY | Facility: CLINIC | Age: 53
End: 2018-09-18
Payer: COMMERCIAL

## 2018-09-18 VITALS
SYSTOLIC BLOOD PRESSURE: 156 MMHG | DIASTOLIC BLOOD PRESSURE: 89 MMHG | HEIGHT: 73 IN | OXYGEN SATURATION: 97 % | HEART RATE: 99 BPM | BODY MASS INDEX: 25.23 KG/M2 | WEIGHT: 190.4 LBS | TEMPERATURE: 97.1 F

## 2018-09-18 DIAGNOSIS — M54.16 LUMBAR RADICULOPATHY: Primary | ICD-10-CM

## 2018-09-18 PROCEDURE — 99213 OFFICE O/P EST LOW 20 MIN: CPT | Performed by: NURSE PRACTITIONER

## 2018-09-18 ASSESSMENT — PAIN SCALES - GENERAL: PAINLEVEL: SEVERE PAIN (7)

## 2018-09-18 NOTE — MR AVS SNAPSHOT
After Visit Summary   9/18/2018    Kip Winn    MRN: [id number]           Patient Information     Date Of Birth          1965        Visit Information        Provider Department      9/18/2018 12:00 PM Yvonne Jimenez NP [facility]        Today's Diagnoses     Lumbar radiculopathy    -  1      Care Instructions    -[facility] [place], please call [phone] to schedule  -Please contact the clinic if pain persists at [phone].      -Patient to follow up with Primary Care provider regarding elevated blood pressure.            Follow-ups after your visit        Future tests that were ordered for you today     Open Future Orders        Priority Expected Expires Ordered    XR Lumbar Epidural Injection Incl Imaging Routine 9/18/2018 9/18/2019 9/18/2018            Who to contact     If you have questions or need follow up information about today's clinic visit or your schedule please contact [facility] directly at [phone].  Normal or non-critical lab and imaging results will be communicated to you by [employer], letter or phone within 4 business days after the clinic has received the results. If you do not hear from us within 7 days, please contact the clinic through [employer] or phone. If you have a critical or abnormal lab result, we will notify you by phone as soon as possible.  Submit refill requests through [employer] or call your pharmacy and they will forward the refill request to us. Please allow 3 business days for your refill to be completed.          Additional Information About Your Visit        [employer] Information     [employer] gives you secure access to your electronic health record. If you see a primary care provider, you can also send messages to your care team and make appointments. If you have questions, please call your primary care clinic.  If you do not have a primary care provider, please  "call [phone] and they will assist you.        Care EveryWhere ID     This is your Care EveryWhere ID. This could be used by other organizations to access your [place] medical records  [id number]        Your Vitals Were     Pulse Temperature Height Pulse Oximetry BMI (Body Mass Index)       99 97.1  F (36.2  C) (Oral) 6' 1\" (1.854 m) 97% 25.12 kg/m2        Blood Pressure from Last 3 Encounters:   09/18/18 156/89   03/19/18 130/85   02/09/18 104/70    Weight from Last 3 Encounters:   09/18/18 190 lb 6.4 oz (86.4 kg)   03/19/18 185 lb (83.9 kg)   02/09/18 178 lb (80.7 kg)               Primary Care Provider Office Phone # Fax #    Dany Richardson [phone]       [address] [place] [address]        Equal Access to Services     [facility] AH: Hadii aad ku hadasho Soomaali, waaxda luqadaha, qaybta kaalmada adeegyada, waxay idiin hayaan joaquín ramosarajosse la'lorenzan . So [facility] [phone].    ATENCIÓN: Si habla español, tiene a capps disposición servicios gratuitos de asistencia lingüística. Llame al [phone].    We comply with applicable federal civil rights laws and [place] laws. We do not discriminate on the basis of race, color, national origin, age, disability, sex, sexual orientation, or gender identity.            Thank you!     Thank you for choosing [facility]  for your care. Our goal is always to provide you with excellent care. Hearing back from our patients is one way we can continue to improve our services. Please take a few minutes to complete the written survey that you may receive in the mail after your visit with us. Thank you!             Your Updated Medication List - Protect others around you: Learn how to safely use, store and throw away your medicines at www.disposemymeds.org.          This list is accurate as of 9/18/18 12:23 PM.  Always use your most recent med list.                   Brand Name Dispense Instructions for use Diagnosis    aspirin 81 MG " tablet     100    ONE DAILY        IBUPROFEN PO      Take 200 mg by mouth        Multi-vitamin Tabs tablet   Generic drug:  multivitamin, therapeutic with minerals      1 TABLET DAILY        VITAMIN C PO      Take 1 tablet by mouth.        VITAMIN D PO      Take 1 tablet by mouth.

## 2018-09-18 NOTE — NURSING NOTE
"Kip Winn is a 53 year old male who presents for:  Chief Complaint   Patient presents with     Neurologic Problem     LBP. MRI 1/4/18. Last injection: 12/28/17. Injection helped but on 9/8/18 he sneezed and since then his back has hurt again, same pain as before. Pain is on the right side low back and radiates down the posterior leg, mostly in the shin area. Sometime the pain can be in the left leg. Denies any N/T. No treatments.         Vitals:    Vitals:    09/18/18 1159   BP: 156/89   Pulse: 99   Temp: 97.1  F (36.2  C)   TempSrc: Oral   SpO2: 97%   Weight: 190 lb 6.4 oz (86.4 kg)   Height: 6' 1\" (1.854 m)       BMI:  Estimated body mass index is 25.12 kg/(m^2) as calculated from the following:    Height as of this encounter: 6' 1\" (1.854 m).    Weight as of this encounter: 190 lb 6.4 oz (86.4 kg).    Pain Score:  Severe Pain (7)        Rody Jamison      "

## 2018-09-18 NOTE — LETTER
9/18/2018         RE: Kip Winn  [address]  [place] [address]        Dear Colleague,    Thank you for referring your patient, Kip Winn, to the [facility]. Please see a copy of my visit note below.    [facility]  Neurosurgery followup:    HPI: Kip Winn is a 53 year old mail with a h/o right sided lumbar radicular pain.  He was last seen at our clinic on 12/13/17 for evaluation of his pain and was referred for a lumbar SMOOTH and PT.  He states he was doing fine after the injections, with essentially complete relief of his pain.  However, he recalls on 9/8/18 sneezing and afterwards experiencing similar pain that he experienced previously.  He states it is a constant pain on the right, describing it along the L4-5 distribution.  He denies falls or foot drop.  He states the pain generally is worse with activities and somewhat relieved while seated for short periods of time.  He has taken OTC medications with some benefit.  He denies changes to bowel or bladder.    Exam:  Constitutional:  Alert, well nourished, NAD.  HEENT: Normocephalic, atraumatic.   Pulm:  Without shortness of breath   CV:  No pitting edema of BLE.      Neurological:  Awake  Alert  Oriented x 3  Motor exam:        IP Q DF PF EHL  R   5  5   5   5    5  L   5  5   5   5    5     Able to spontaneously move L/E bilaterally  Sensation intact throughout all L/E dermatomes    Lumbar examination with tenderness to right paraspinous muscles.  ROM uncomfortable but now significantly limited.  SLR negative.     Imaging: L4-5 right disc extrusion    A/P:  Lumbar Radiculopathy    Kip Winn is a 53 year old mail with a h/o right sided lumbar radicular pain.  He was last seen at our clinic on 12/13/17 for evaluation of his pain and was referred for a lumbar SMOOTH and PT.  He states he was doing fine after the injections, with essentially complete relief of his pain.  However, he recalls on 9/8/18 sneezing and  afterwards experiencing similar pain that he experienced previously.  He states it is a constant pain on the right, describing it along the L4-5 distribution.  He denies falls or foot drop.  He states the pain generally is worse with activities and somewhat relieved while seated for short periods of time.  He has taken OTC medications with some benefit.  He denies changes to bowel or bladder. The patient is interested in a repeat SMOOTH.  We will order the injection at his location preference.  If the pain does not improve and/or weakness or symptoms return, we discussed ordering an updated MRI.  He is agreeable and will contact us.    Patient Instructions   -Schedule lumbar epidural steroid injection with [facility] in [place], please call [phone] to schedule  -Please contact the clinic if pain persists at [phone].      -Patient to follow up with Primary Care provider regarding elevated blood pressure.      Yvonne Jimenez CNP  [facility]  [address] [address]    Tel [phone]  Pager [phone]      Again, thank you for allowing me to participate in the care of your patient.        Sincerely,        Yvonne Jimenez, GIBRAN

## 2018-09-18 NOTE — PATIENT INSTRUCTIONS
-[facility] [place], please call [phone] to schedule  -Please contact the clinic if pain persists at [phone].      -Patient to follow up with Primary Care provider regarding elevated blood pressure.

## 2018-09-18 NOTE — PROGRESS NOTES
[facility]  Neurosurgery followup:    HPI: Kip Winn is a 53 year old mail with a h/o right sided lumbar radicular pain.  He was last seen at our clinic on 12/13/17 for evaluation of his pain and was referred for a lumbar SMOOTH and PT.  He states he was doing fine after the injections, with essentially complete relief of his pain.  However, he recalls on 9/8/18 sneezing and afterwards experiencing similar pain that he experienced previously.  He states it is a constant pain on the right, describing it along the L4-5 distribution.  He denies falls or foot drop.  He states the pain generally is worse with activities and somewhat relieved while seated for short periods of time.  He has taken OTC medications with some benefit.  He denies changes to bowel or bladder.    Exam:  Constitutional:  Alert, well nourished, NAD.  HEENT: Normocephalic, atraumatic.   Pulm:  Without shortness of breath   CV:  No pitting edema of BLE.      Neurological:  Awake  Alert  Oriented x 3  Motor exam:        IP Q DF PF EHL  R   5  5   5   5    5  L   5  5   5   5    5     Able to spontaneously move L/E bilaterally  Sensation intact throughout all L/E dermatomes    Lumbar examination with tenderness to right paraspinous muscles.  ROM uncomfortable but now significantly limited.  SLR negative.     Imaging: L4-5 right disc extrusion    A/P:  Lumbar Radiculopathy    Kip Winn is a 53 year old mail with a h/o right sided lumbar radicular pain.  He was last seen at our clinic on 12/13/17 for evaluation of his pain and was referred for a lumbar SMOOTH and PT.  He states he was doing fine after the injections, with essentially complete relief of his pain.  However, he recalls on 9/8/18 sneezing and afterwards experiencing similar pain that he experienced previously.  He states it is a constant pain on the right, describing it along the L4-5 distribution.  He denies falls or foot drop.  He states the pain generally is worse with  activities and somewhat relieved while seated for short periods of time.  He has taken OTC medications with some benefit.  He denies changes to bowel or bladder. The patient is interested in a repeat SMOOTH.  We will order the injection at his location preference.  If the pain does not improve and/or weakness or symptoms return, we discussed ordering an updated MRI.  He is agreeable and will contact us.    Patient Instructions   -[facility] [place], please call [phone] to schedule  -Please contact the clinic if pain persists at [phone].      -Patient to follow up with Primary Care provider regarding elevated blood pressure.      Yvonne Jimenez [facility]  [facility]  [address] [address]    Tel [phone]  Pager [phone]

## 2018-09-24 ENCOUNTER — TRANSFERRED RECORDS (OUTPATIENT)
Dept: HEALTH INFORMATION MANAGEMENT | Facility: CLINIC | Age: 53
End: 2018-09-24

## 2018-10-05 ENCOUNTER — MYC MEDICAL ADVICE (OUTPATIENT)
Dept: NEUROSURGERY | Facility: CLINIC | Age: 53
End: 2018-10-05

## 2018-10-05 DIAGNOSIS — M54.16 LUMBAR RADICULAR PAIN: Primary | ICD-10-CM

## 2018-10-10 ENCOUNTER — TRANSFERRED RECORDS (OUTPATIENT)
Dept: HEALTH INFORMATION MANAGEMENT | Facility: CLINIC | Age: 53
End: 2018-10-10

## 2018-10-24 ENCOUNTER — OFFICE VISIT (OUTPATIENT)
Dept: NEUROSURGERY | Facility: CLINIC | Age: 53
End: 2018-10-24
Attending: NEUROLOGICAL SURGERY
Payer: COMMERCIAL

## 2018-10-24 DIAGNOSIS — M54.16 LUMBAR RADICULOPATHY: Primary | ICD-10-CM

## 2018-10-24 PROCEDURE — 99213 OFFICE O/P EST LOW 20 MIN: CPT | Performed by: NEUROLOGICAL SURGERY

## 2018-10-24 PROCEDURE — G0463 HOSPITAL OUTPT CLINIC VISIT: HCPCS

## 2018-10-24 ASSESSMENT — PAIN SCALES - GENERAL: PAINLEVEL: MODERATE PAIN (4)

## 2018-10-24 NOTE — LETTER
10/24/2018         RE: Kip Winn  [address]  [place] [address]        Dear Colleague,    Thank you for referring your patient, Kip Winn, to the [place] [facility]. Please see a copy of my visit note below.    Neurosurgery Follow Up Clinic Visit      Kip Winn returns for follow up visit regarding his left lower extremity pain.    Currently the patient describes having right lower extremity pain initially 1 year ago that has now progressed to left lower extremity pain.  He has tried physical therapy and epidural steroid injections without resolution.  He said he had a sneeze in September of this year and had significant improved decrease in his radicular pain down his left lower extremity.  He has difficult time ambulating as a positive shopping cart sign and says the pain affects his activities of daily living.    Exam is stable with symmetric motor strength  He has decreased sensation in the left L5 distribution  Positive Lasegue's sign on the right at 50 degrees    We reviewed the MRI of his lumbar spine is noted to have a large central L4-5 herniated disc with stenosis both on the right and left side      Plan:   I recommended that the patient have a left L4-5 microdiscectomy.  He would like to try physical therapy prior to surgery to see if he can have any improvement before surgery.I discussed with the patient the risk of surgery to include, but, not be limited to; nerve injury, failure of improvement of symptoms, CSF leak, infection, post op hematoma, the need for recurrent surgery, paralysis, coma and death               Again, thank you for allowing me to participate in the care of your patient.        Sincerely,        Armando Rico MD

## 2018-10-24 NOTE — NURSING NOTE
Pre-operative Education    Education included but not limited to:  - Pre-operative physical with primary care physician within 30 days of surgical date. Pre op at [facility].  - Pre-operative clearance (cardiology, hematology, etc).   - Discontinue Aspirin, NSAIDs, Diclofenac x 7 days prior to surgical date.   -May try tylenol for pain 1000 mg three times per day for pain   -you may resume taking NSAIDs 7 days post op   -Patient is a current smoker. Patient is planning to quit on 10/25/18. Patient educated on the importance of smoking cessation and how smoking inhibits healing. Patient received handout of resources to quit smoking. Patient was offered prescription for smoking cessation but declined at this time.  -Forms to be completed: fmla/STD 4 weeks   -Surgical risks: blood clots in the leg or lung, problems urinating, nerve damage, drainage from the incision, infection,stiffness  -Preparation timeline  - When to start NPO           -Special bathing procedure.Patient received Hibiclens and showering instruction sheet.   Hospital stay:  Checking in  The surgery itself   Recovery room  - Managing pain   - Likely same day procedure with discharge home day of surgery, may stay for 23 hour observation hospitalization for monitoring  - Post operative incisional pain x 1-2 weeks which will require pain medications and muscle relaxants.   -Do NOT drive or drink alcohol while taking narcotic pain medication.  -Post operative incision care-Keep your incision clean and dry at all times. OK to remove dressing on postop day 2. OK to shower on postop day 3 and allow water to run over incision, pat dry after shower. No bathing, swimming or submerging in water. Call immediately or come to ED if any drainage occurs, or you develop new pain. Watch for signs of infection: redness, swelling, warmth, drainage, and fever of 101 degrees or higher. Notify clinic.   - Post operative activity limitations: 6-8 weeks, no lifting > 10  pounds, no bending, twisting, or overhead reaching.  -Ok to walk as tolerated, avoid bed rest and prolonged sitting.  -No contact sports until after follow up visit  -No high impact activities such as; running/jogging, snowmobile or 4 alford riding or any other recreational vehicles.  - Post op follow up appointments: 6 weeks with Nurse Practitioner. Please call to schedule follow up appointment at [phone].   - Spine Education book was also given to the patient for further review.      Patient verbalized understanding of above instructions. All questions were answered to the best of my ability and the patient's satisfaction. Patient advised to call with any additional questions or concerns.  Anais Ortiz RN

## 2018-10-24 NOTE — PROGRESS NOTES
Neurosurgery Follow Up Clinic Visit      Kip Winn returns for follow up visit regarding his left lower extremity pain.    Currently the patient describes having right lower extremity pain initially 1 year ago that has now progressed to left lower extremity pain.  He has tried physical therapy and epidural steroid injections without resolution.  He said he had a sneeze in September of this year and had significant improved decrease in his radicular pain down his left lower extremity.  He has difficult time ambulating as a positive shopping cart sign and says the pain affects his activities of daily living.    Exam is stable with symmetric motor strength  He has decreased sensation in the left L5 distribution  Positive Lasegue's sign on the right at 50 degrees    We reviewed the MRI of his lumbar spine is noted to have a large central L4-5 herniated disc with stenosis both on the right and left side      Plan:   I recommended that the patient have a left L4-5 microdiscectomy.  He would like to try physical therapy prior to surgery to see if he can have any improvement before surgery.I discussed with the patient the risk of surgery to include, but, not be limited to; nerve injury, failure of improvement of symptoms, CSF leak, infection, post op hematoma, the need for recurrent surgery, paralysis, coma and death

## 2018-10-24 NOTE — MR AVS SNAPSHOT
After Visit Summary   10/24/2018    Kip Winn    MRN: [id number]           Patient Information     Date Of Birth          1965        Visit Information        Provider Department      10/24/2018 9:10 AM Armando Rico MD [facility]        Care Instructions    Patient Instructions  Pre-Operative:  -Surgery scheduled at [facility] for Left L4-5 Microdiscectomy   -Surgical risks: blood clots in the leg or lung, problems urinating, nerve damage, drainage from the incision, infection,stiffness  - Pre-operative physical with primary care physician within 30 days of surgical date.   - Likely same day procedure with discharge home day of surgery, may stay for 23 hour observation hospitalization for monitoring.     -Shower procedure: please shower with antimicrobial soap the night before surgery and morning of surgery. Please refer to showering instruction sheet in folder.  -Stop all solid foods 8 hours before surgery.  -Keep drinking clear liquids until 4 hours before surgery. Clear liquids include water, clear juice, black coffee, or clear tea without milk, Gatorade, clear soda.   - Discontinue Aspirin, NSAIDs (Advil/Ibuprofen, Naproxen,Nuprin,Relafen/Nabumetone, Diclofenac,Meloxicam, Aleve, Celebrex) x 7 days prior to surgical date.     - May try tylenol for pain 1000 mg three times per day for pain      Post-Operative:  -you may resume taking NSAIDs 7 days post op   - Post operative incisional pain x 1-2 weeks which will require pain medications and muscle relaxants. You will receive medication upon discharge.  -Do NOT drive while taking narcotic pain medication.  -Do not drink alcohol while using any pain medication  -Post operative incision care- Watch for signs of infection: redness, swelling, warmth, drainage, and fever of 101 degrees or higher. Notify clinic [phone].  -No submerging incision in water such as pools, hot tubs,baths for at least  8 weeks or until incision is healed. Showers are fine.   - Post operative activity limitations: 6-8 weeks, no lifting > 10 pounds, no bending, twisting, or overhead reaching.  -Ok to walk as tolerated, avoid bed rest and prolonged sitting.  -No contact sports until after follow up visit  -No high impact activities such as; running/jogging, snowmobile or 4 alford riding or any other recreational vehicles.  - Post op follow up appointments:6 week post op follow up visit with Nurse practitioner.Please call to schedule follow up appointment at [phone].  -If you are currently employed, you will need to be off work for 2-4 weeks for post op recovery and healing.               Follow-ups after your visit        Who to contact     If you have questions or need follow up information about today's clinic visit or your schedule please contact [facility] directly at [phone].  Normal or non-critical lab and imaging results will be communicated to you by [employer], letter or phone within 4 business days after the clinic has received the results. If you do not hear from us within 7 days, please contact the clinic through [employer] or phone. If you have a critical or abnormal lab result, we will notify you by phone as soon as possible.  Submit refill requests through [employer] or call your pharmacy and they will forward the refill request to us. Please allow 3 business days for your refill to be completed.          Additional Information About Your Visit        [employer] Information     [employer] gives you secure access to your electronic health record. If you see a primary care provider, you can also send messages to your care team and make appointments. If you have questions, please call your primary care clinic.  If you do not have a primary care provider, please call [phone] and they will assist you.        Care EveryWhere ID     This is your Care EveryWhere ID. This could be used by other  "organizations to access your [place] medical records  [id number]        Your Vitals Were     Height BMI (Body Mass Index)                6' 1\" (1.854 m) 25.07 kg/m2           Blood Pressure from Last 3 Encounters:   09/18/18 156/89   03/19/18 130/85   02/09/18 104/70    Weight from Last 3 Encounters:   10/24/18 190 lb (86.2 kg)   09/18/18 190 lb 6.4 oz (86.4 kg)   03/19/18 185 lb (83.9 kg)              Today, you had the following     No orders found for display       Primary Care Provider Office Phone # Fax #    Dany Richardson [phone]       [address]  [place] [address]        Equal Access to Services     [facility]: Hadii marisol deano Solupe, waaxda luqadaha, qaybta kaalmada adeegyada, kika chicas . So [facility] [phone].    ATENCIÓN: Si habla español, tiene a capps disposición servicios gratuitos de asistencia lingüística. Emely al [phone].    We comply with applicable federal civil rights laws and [place] laws. We do not discriminate on the basis of race, color, national origin, age, disability, sex, sexual orientation, or gender identity.            Thank you!     Thank you for choosing [facility]  for your care. Our goal is always to provide you with excellent care. Hearing back from our patients is one way we can continue to improve our services. Please take a few minutes to complete the written survey that you may receive in the mail after your visit with us. Thank you!             Your Updated Medication List - Protect others around you: Learn how to safely use, store and throw away your medicines at www.disposemymeds.org.          This list is accurate as of 10/24/18  9:31 AM.  Always use your most recent med list.                   Brand Name Dispense Instructions for use Diagnosis    aspirin 81 MG tablet     100    ONE DAILY        IBUPROFEN PO      Take 200 mg by mouth        Multi-vitamin Tabs tablet "   Generic drug:  multivitamin, therapeutic with minerals      1 TABLET DAILY        VITAMIN C PO      Take 1 tablet by mouth.        VITAMIN D PO      Take 1 tablet by mouth.

## 2018-10-24 NOTE — PATIENT INSTRUCTIONS
-physical therapy. MARY will call you to schedule.       Patient Instructions  Pre-Operative:  -Surgery scheduled at [facility] for Left L4-5 Microdiscectomy   -Surgical risks: blood clots in the leg or lung, problems urinating, nerve damage, drainage from the incision, infection,stiffness  - Pre-operative physical with primary care physician within 30 days of surgical date.   - Likely same day procedure with discharge home day of surgery, may stay for 23 hour observation hospitalization for monitoring.     -Shower procedure: please shower with antimicrobial soap the night before surgery and morning of surgery. Please refer to showering instruction sheet in folder.  -Stop all solid foods 8 hours before surgery.  -Keep drinking clear liquids until 4 hours before surgery. Clear liquids include water, clear juice, black coffee, or clear tea without milk, Gatorade, clear soda.   - Discontinue Aspirin, NSAIDs (Advil/Ibuprofen, Naproxen,Nuprin,Relafen/Nabumetone, Diclofenac,Meloxicam, Aleve, Celebrex) x 7 days prior to surgical date.     - May try tylenol for pain 1000 mg three times per day for pain      Post-Operative:  -you may resume taking NSAIDs 7 days post op   - Post operative incisional pain x 1-2 weeks which will require pain medications and muscle relaxants. You will receive medication upon discharge.  -Do NOT drive while taking narcotic pain medication.  -Do not drink alcohol while using any pain medication  -Post operative incision care- Watch for signs of infection: redness, swelling, warmth, drainage, and fever of 101 degrees or higher. [facility] [phone].  -No submerging incision in water such as pools, hot tubs,baths for at least 8 weeks or until incision is healed. Showers are fine.   - Post operative activity limitations: 6-8 weeks, no lifting > 10 pounds, no bending, twisting, or overhead reaching.  -Ok to walk as tolerated, avoid bed rest and prolonged sitting.  -No contact sports  until after follow up visit  -No high impact activities such as; running/jogging, snowmobile or 4 alford riding or any other recreational vehicles.  - Post op follow up appointments:6 week post op follow up visit with Nurse practitioner.Please call to schedule follow up appointment at [phone].  -If you are currently employed, you will need to be off work for 2-4 weeks for post op recovery and healing.

## 2018-10-25 VITALS
HEIGHT: 73 IN | WEIGHT: 190 LBS | HEART RATE: 93 BPM | SYSTOLIC BLOOD PRESSURE: 139 MMHG | DIASTOLIC BLOOD PRESSURE: 84 MMHG | BODY MASS INDEX: 25.18 KG/M2

## 2018-11-01 ENCOUNTER — OFFICE VISIT (OUTPATIENT)
Dept: FAMILY MEDICINE | Facility: CLINIC | Age: 53
End: 2018-11-01
Payer: COMMERCIAL

## 2018-11-01 ENCOUNTER — THERAPY VISIT (OUTPATIENT)
Dept: PHYSICAL THERAPY | Facility: CLINIC | Age: 53
End: 2018-11-01
Attending: NEUROLOGICAL SURGERY
Payer: COMMERCIAL

## 2018-11-01 VITALS
SYSTOLIC BLOOD PRESSURE: 120 MMHG | DIASTOLIC BLOOD PRESSURE: 80 MMHG | TEMPERATURE: 97 F | WEIGHT: 181 LBS | HEART RATE: 76 BPM | BODY MASS INDEX: 23.88 KG/M2

## 2018-11-01 DIAGNOSIS — M54.16 LUMBAR RADICULOPATHY: ICD-10-CM

## 2018-11-01 DIAGNOSIS — Z01.818 PREOP GENERAL PHYSICAL EXAM: Primary | ICD-10-CM

## 2018-11-01 LAB — HGB BLD-MCNC: 14.5 G/DL (ref 13.3–17.7)

## 2018-11-01 PROCEDURE — 97110 THERAPEUTIC EXERCISES: CPT | Mod: GP | Performed by: PHYSICAL THERAPIST

## 2018-11-01 PROCEDURE — 97161 PT EVAL LOW COMPLEX 20 MIN: CPT | Mod: GP | Performed by: PHYSICAL THERAPIST

## 2018-11-01 PROCEDURE — 97112 NEUROMUSCULAR REEDUCATION: CPT | Mod: GP | Performed by: PHYSICAL THERAPIST

## 2018-11-01 PROCEDURE — 85018 HEMOGLOBIN: CPT | Performed by: FAMILY MEDICINE

## 2018-11-01 PROCEDURE — 99214 OFFICE O/P EST MOD 30 MIN: CPT | Performed by: FAMILY MEDICINE

## 2018-11-01 PROCEDURE — 36415 COLL VENOUS BLD VENIPUNCTURE: CPT | Performed by: FAMILY MEDICINE

## 2018-11-01 NOTE — MR AVS SNAPSHOT
After Visit Summary   11/1/2018    Kip Winn    MRN: [id number]           Patient Information     Date Of Birth          1965        Visit Information        Provider Department      11/1/2018 9:40 AM Keisha Ríos, PT [facility] [place] [facility]        Today's Diagnoses     Lumbar radiculopathy           Follow-ups after your visit        Your next 10 appointments already scheduled     Nov 05, 2018  7:40 AM CST   MARY Spine with Keisha Ríos, PT   [facility] [place] [facility] ([facility] [place])    [address]  [address]  [place] [address]   [phone]            Nov 12, 2018  7:00 AM CST   MARY Spine with Keisha Ríos, PT   [facility] [facility] ([facility] [place])    [address]  [address]  [place] [address]   [phone]            Nov 15, 2018  7:40 AM CST   MARY Spine with Keisha Ríos, PT   [facility] [facility] ([facility] [place])    [address]  [address]  [place] [address]   [phone]            Nov 19, 2018   Procedure with Armando Rico MD   [facility] (--)    [address] [facility] [address]   [phone]            Jan 02, 2019 11:10 AM CST   Return Visit with Yvonne Jimenez NP   [facility] ([facility] [place] [facility])    [address] [place] [id number] [phone]              Who to contact     If you have questions or need follow up information about today's clinic visit or your schedule please contact [facility] [place] [facility] directly at [phone].  Normal or non-critical lab and imaging results will be communicated to you by MyChart, letter or phone within 4 business days after the  clinic has received the results. If you do not hear from us within 7 days, please contact the clinic through [employer] or phone. If you have a critical or abnormal lab result, we will notify you by phone as soon as possible.  Submit refill requests through [employer] or call your pharmacy and they will forward the refill request to us. Please allow 3 business days for your refill to be completed.          Additional Information About Your Visit        [employer] Information     [employer] gives you secure access to your electronic health record. If you see a primary care provider, you can also send messages to your care team and make appointments. If you have questions, please call your primary care clinic.  If you do not have a primary care provider, please call [phone] and they will assist you.        Care EveryWhere ID     This is your Care EveryWhere ID. This could be used by other organizations to access your [place] medical records  [id number]         Blood Pressure from Last 3 Encounters:   11/01/18 120/80   10/24/18 139/84   09/18/18 156/89    Weight from Last 3 Encounters:   11/01/18 82.1 kg (181 lb)   10/24/18 86.2 kg (190 lb)   09/18/18 86.4 kg (190 lb 6.4 oz)              We Performed the Following     HC PT EVAL, LOW COMPLEXITY     MARY INITIAL EVAL REPORT     MARY PT, HAND, AND CHIROPRACTIC REFERRAL     NEUROMUSCULAR RE-EDUCATION     THERAPEUTIC EXERCISES        Primary Care Provider Office Phone # Fax #    Dany Richardson [phone]        [address]  [place] [address]        Equal Access to Services     [facility] : Hadii aad ku hadasho Soomaali, waaxda luqadaha, qaybta kaalmada adeegyada, kika christine. So [facility] [phone].    ATENCIÓN: Si habla español, tiene a capps disposición servicios gratuitos de asistencia lingüística. Llame al [phone].    We comply with applicable federal civil rights laws and [place] laws. We do not discriminate on  the basis of race, color, national origin, age, disability, sex, sexual orientation, or gender identity.            Thank you!     Thank you for choosing [facility]  [place] [facility]  for your care. Our goal is always to provide you with excellent care. Hearing back from our patients is one way we can continue to improve our services. Please take a few minutes to complete the written survey that you may receive in the mail after your visit with us. Thank you!             Your Updated Medication List - Protect others around you: Learn how to safely use, store and throw away your medicines at www.disposemymeds.org.          This list is accurate as of 11/1/18  1:50 PM.  Always use your most recent med list.                   Brand Name Dispense Instructions for use Diagnosis    aspirin 81 MG tablet     100    ONE DAILY        IBUPROFEN PO      Take 200 mg by mouth        Multi-vitamin Tabs tablet   Generic drug:  multivitamin, therapeutic with minerals      1 TABLET DAILY        varenicline 0.5 MG X 11 & 1 MG X 42 tablet    CHANTIX STARTING MONTH NEHEMIAS    53 tablet    Take 0.5 mg tab daily for 3 days, then 0.5 mg tab twice daily for 4 days, then 1 mg twice daily.    Smoking addiction       VITAMIN C PO      Take 1 tablet by mouth.        VITAMIN D PO      Take 1 tablet by mouth.

## 2018-11-01 NOTE — PROGRESS NOTES
[facility] Initial Evaluation  Subjective:  Patient is a 53 year old male presenting with rehab back hpi.   Kip Winn is a 53 year old male with a lumbar condition.  Condition occurred with:  Repetition/overuse and bending (sneezing).  Condition occurred: for unknown reasons.  This is a new condition  MD order 10/24/18. Kip has bene ahving pain in his lower back since last Nov, he received cortisone shot in Dec 2017. The pain got better but Sep 2018 he sneezed and felt severe pain in his back. The pain never got better. So met with his provider, took MRI and was diagnosed with L4-5 disc protrusion. He is undergoing surgery on Nov 19th. He is here to learn exercises to get stronger before surgery. .    Patient reports pain:  Lumbar spine right, lumbar spine left and lower lumbar spine.  Radiates to:  Gluteals right, gluteals left, thigh left, thigh right, knee left, lower leg left, foot left, foot right, knee right and lower leg right.  Pain is described as aching and is constant and reported as 3/10 and 7/10.  Associated symptoms:  Loss of motion/stiffness, loss of strength, numbness and tingling. Pain is the same all the time.  Symptoms are exacerbated by bending, lying down, walking, lifting, carrying, standing and twisting and relieved by rest and NSAID's.  Since onset symptoms are gradually improving.  Special tests:  MRI.      General health as reported by patient is good.  Pertinent medical history includes:  None.  Medical allergies: no.  Other surgeries include:  No.  Current medications:  Anti-inflammatory and pain medication.  Current occupation is Desk job  .    Primary job tasks include:  Prolonged sitting and repetitive tasks.                                Objective:  System         Lumbar/SI Evaluation  ROM:    AROM Lumbar:   Flexion:            Till knees pain level 3/10  Ext:                    20% limited no pain   Side Bend:        Left:  WFL no pain    Right:  WFL no  pain  Rotation:           Left:  WFL no pain    Right:  WFL no pain  Side Glide:        Left:     Right:           Lumbar Myotomes:  normal            Lumbar DTR's:  normal      Cord Signs:  normal    Lumbar Dermtomes:  normal                Neural Tension/Mobility:    Left side:  SLR; SLR w/DF and Slump positive.   Right side:   SLR w/DF; Slump and SLR positive.  Lumbar Palpation:    Tenderness present at Left:    Erector Spinae; Piriformis and Hamstrings  Tenderness present at Right: Erector Spinae; Piriformis and Hamstrings    Lumbar Provocation:  normal                                                       Renetta Lumbar Evaluation    Posture:  Sitting: fair  Standing: fair  Lordosis: WNL  Lateral Shift: no  Correction of Posture: better    Movement Loss:  Flexion (Flex): major and pain  Extension (EXT): min and pain  Side [place] R (SG R): nil  Side Glide L (SG L): nil  Test Movements:  FIS: During: increases  After: no worse    Repeat FIS: During: increases  After: worse    EIS: During: no effect  After: no effect    Repeat EIS: During: abolishes  After: better            Static Tests:          Lying Prone in Extension: tolerated prone lying today- did not assess extension form prone position today    Conclusion: derangement, posture and other  Principle of Treatment:  Posture Correction: neutral sit and stand educated and discussed  Flexion: avoided  Extension: to neutral encouraged                                           ROS    Assessment/Plan:    Patient is a 53 year old male with lumbar complaints.    Patient has the following significant findings with corresponding treatment plan.                Diagnosis 1:  Lumbar radiculopathy  Pain -  hot/cold therapy, US, electric stimulation, mechanical traction, manual therapy, STS, self management, education, directional preference exercise and home program  Decreased ROM/flexibility - manual therapy, therapeutic exercise, therapeutic activity and home  program  Decreased strength - therapeutic exercise, therapeutic activities and home program  Impaired gait - gait training and home program  Decreased function - therapeutic activities and home program  Impaired posture - neuro re-education, therapeutic activities and home program    Therapy Evaluation Codes:   1) History comprised of:   Personal factors that impact the plan of care:      Past/current experiences and Time since onset of symptoms.    Comorbidity factors that impact the plan of care are:      None.     Medications impacting care: Anti-inflammatory and Pain.  2) Examination of Body Systems comprised of:   Body structures and functions that impact the plan of care:      Lumbar spine.   Activity limitations that impact the plan of care are:      Bending, Dressing, Lifting, Squatting/kneeling, Stairs, Standing, Walking and Sleeping.  3) Clinical presentation characteristics are:   Stable/Uncomplicated.  4) Decision-Making    Low complexity using standardized patient assessment instrument and/or measureable assessment of functional outcome.  Cumulative Therapy Evaluation is: Low complexity.    Previous and current functional limitations:  (See Goal Flow Sheet for this information)    Short term and Long term goals: (See Goal Flow Sheet for this information)     Communication ability:  Patient appears to be able to clearly communicate and understand verbal and written communication and follow directions correctly.  Treatment Explanation - The following has been discussed with the patient:   RX ordered/plan of care  Anticipated outcomes  Possible risks and side effects  This patient would benefit from PT intervention to resume normal activities.   Rehab potential is good.    Frequency:  2 X week, once daily  Duration:  for 2 weeks  Discharge Plan:  Achieve all LTG.  Independent in home treatment program.  Reach maximal therapeutic benefit.    Please refer to the daily flowsheet for treatment today, total  treatment time and time spent performing 1:1 timed codes.

## 2018-11-01 NOTE — PROGRESS NOTES
[facility]  [address] [address]  [phone]  Dept: [phone]    PRE-OP EVALUATION:  Today's date: 2018    Kip Winn (: 1965) presents for pre-operative evaluation assessment as requested by Dr. Rico.  He requires evaluation and anesthesia risk assessment prior to undergoing surgery/procedure for treatment of large central L4-5 herniated disc.    Proposed Surgery/ Procedure: Left L4-5 microdiscectomy  Date of Surgery/ Procedure:   Time of Surgery/ Procedure: 9:45am  Hospital/Surgical Facility: [facility]    Primary Physician: Dany Richardson  Type of Anesthesia Anticipated: to be determined    Patient has a Health Care Directive or Living Will:  YES    1. NO - Do you have a history of heart attack, stroke, stent, bypass or surgery on an artery in the head, neck, heart or legs?  2. NO - Do you ever have any pain or discomfort in your chest?  3. NO - Do you have a history of  Heart Failure?  4. NO - Are you troubled by shortness of breath when: walking on the level, up a slight hill or at night?  5. NO - Do you currently have a cold, bronchitis or other respiratory infection?  6. NO - Do you have a cough, shortness of breath or wheezing?  7. NO - Do you sometimes get pains in the calves of your legs when you walk?  8. NO - Do you or anyone in your family have previous history of blood clots?  9. NO - Do you or does anyone in your family have a serious bleeding problem such as prolonged bleeding following surgeries or cuts?  10. NO - Have you ever had problems with anemia or been told to take iron pills?  11. NO - Have you had any abnormal blood loss such as black, tarry or bloody stools, or abnormal vaginal bleeding?  12. NO - Have you ever had a blood transfusion?  13. NO - Have you or any of your relatives ever had problems with anesthesia?  14. NO - Do you have sleep apnea, excessive snoring or daytime drowsiness?  15. NO - Do you have any  prosthetic heart valves?  16. NO - Do you have prosthetic joints?  17. NO - Is there any chance that you may be pregnant?      HPI:     HPI related to upcoming procedure:       See problem list for active medical problems.  Problems all longstanding and stable, except as noted/documented.  See ROS for pertinent symptoms related to these conditions.                                                                                                                                                          .    MEDICAL HISTORY:     Patient Active Problem List    Diagnosis Date Noted     Smoker 07/01/2011     Priority: High     Herniated nucleus pulposus, L4-5 right 12/20/2017     Priority: Medium     Lumbar radiculopathy 12/20/2017     Priority: Medium     Hemangioma of forehead 07/19/2012     Priority: Medium     Low HDL (under 40) 07/19/2012     Priority: Medium     Family history of prostate cancer 07/01/2011     Priority: Medium     CARDIOVASCULAR SCREENING; LDL GOAL LESS THAN 130 10/31/2010     Priority: Medium      Past Medical History:   Diagnosis Date     Coronary artery disease      Fatigue      Fatigue 7/1/2011     Lumbar disc herniation     L4-5     Smoker 7/1/2011     Past Surgical History:   Procedure Laterality Date     COLONOSCOPY N/A 3/19/2018    Procedure: COLONOSCOPY;  colonoscopy;  Surgeon: Mya Arauz MD;  Location:  GI     EYE SURGERY      Lasik 5 years ago     NO HISTORY OF SURGERY       Current Outpatient Prescriptions   Medication Sig Dispense Refill     Ascorbic Acid (VITAMIN C PO) Take 1 tablet by mouth.       ASPIRIN 81 MG OR TABS ONE DAILY 100 3     IBUPROFEN PO Take 200 mg by mouth       MULTI-VITAMIN OR TABS 1 TABLET DAILY       varenicline (CHANTIX STARTING MONTH NEHEMIAS) 0.5 MG X 11 & 1 MG X 42 tablet Take 0.5 mg tab daily for 3 days, then 0.5 mg tab twice daily for 4 days, then 1 mg twice daily. 53 tablet 0     VITAMIN D PO Take 1 tablet by mouth.       OTC products: None, except as  noted above    Allergies   Allergen Reactions     Penicillins      Throat felt like it was closing and bloody nose      Latex Allergy: NO    Social History   Substance Use Topics     Smoking status: Current Every Day Smoker     Packs/day: 1.00     Years: 20.00     Types: Cigarettes     Smokeless tobacco: Never Used     Alcohol use 0.0 oz/week     0 Standard drinks or equivalent per week      Comment: 2 drinks a month     History   Drug Use No       REVIEW OF SYSTEMS:   CONSTITUTIONAL: NEGATIVE for fever, chills, change in weight  ENT/MOUTH: NEGATIVE for ear, mouth and throat problems  RESP: NEGATIVE for significant cough or SOB  CV: NEGATIVE for chest pain, palpitations or peripheral edema    EXAM:   /80  Pulse 76  Temp 97  F (36.1  C) (Tympanic)  Wt 181 lb (82.1 kg)  BMI 23.88 kg/m2  GENERAL APPEARANCE: healthy, alert and no distress  HENT: ear canals and TM's normal and nose and mouth without ulcers or lesions  RESP: lungs clear to auscultation - no rales, rhonchi or wheezes  CV: regular rate and rhythm, normal S1 S2, no S3 or S4 and no murmur, click or rub   ABDOMEN: soft, nontender, no HSM or masses and bowel sounds normal  NEURO: Normal strength and tone, sensory exam grossly normal, mentation intact and speech normal    DIAGNOSTICS:        Recent Labs   Lab Test  02/09/18   0900  10/07/16   0754   10/03/14   0814  07/29/14   1432  07/21/13   0705  07/19/13   0859  08/03/12   0803   HGB   --    --    --    --   14.4   --   15.6  15.3   PLT   --    --    --    --   248   --    --   246   NA  144  141   < >   --   140   --    --   141   POTASSIUM  4.2  4.1   < >   --   3.5   --    --   3.9   CR  0.77  0.77   < >   --   0.85   --    --   0.75   A1C   --    --    --   5.7   --   5.6   --    --     < > = values in this interval not displayed.        IMPRESSION:   Reason for surgery/procedure:   Diagnosis/reason for consult:   1. Preop general physical exam    2. Lumbar radiculopathy          The proposed  surgical procedure is considered LOW risk.    REVISED CARDIAC RISK INDEX  The patient has the following serious cardiovascular risks for perioperative complications such as (MI, PE, VFib and 3  AV Block):  No serious cardiac risks  INTERPRETATION: 0 risks: Class I (very low risk - 0.4% complication rate)    The patient has the following additional risks for perioperative complications:  No identified additional risks      ICD-10-CM    1. Preop general physical exam Z01.818 Hemoglobin   2. Lumbar radiculopathy M54.16 Hemoglobin       RECOMMENDATIONS:         APPROVAL GIVEN to proceed with proposed procedure, without further diagnostic evaluation.  Advised to hold aspirin for at least 1 week prior to surgery.       Signed Electronically by: Dany Richardson MD    Copy of this evaluation report is provided to requesting physician.    [facility] Preop Guidelines    Revised Cardiac Risk Index

## 2018-11-01 NOTE — MR AVS SNAPSHOT
After Visit Summary   11/1/2018    Kip Winn    MRN: [id number]           Patient Information     Date Of Birth          1965        Visit Information        Provider Department      11/1/2018 8:00 AM Dany Richardson MD [facility] [place]        Today's Diagnoses     Preop general physical exam    -  1    Lumbar radiculopathy          Care Instructions      Before Your Surgery      Call your surgeon if there is any change in your health. This includes signs of a cold or flu (such as a sore throat, runny nose, cough, rash or fever).    Do not smoke, drink alcohol or take over the counter medicine (unless your surgeon or primary care doctor tells you to) for the 24 hours before and after surgery.    If you take prescribed drugs: Follow your doctor s orders about which medicines to take and which to stop until after surgery.    Eating and drinking prior to surgery: follow the instructions from your surgeon    Take a shower or bath the night before surgery. Use the soap your surgeon gave you to gently clean your skin. If you do not have soap from your surgeon, use your regular soap. Do not shave or scrub the surgery site.  Wear clean pajamas and have clean sheets on your bed.           Follow-ups after your visit        Follow-up notes from your care team     Return in about 6 months (around 5/1/2019) for Physical Exam.      Your next 10 appointments already scheduled     Nov 01, 2018  9:40 AM CDT   (Arrive by 9:25 AM)   [facility] with Keisha Ríos PT   [place] [facility] [place] [facility] ([facility] [place])    [address] [place] [address]  [address]  [place] [address]   [phone]            Nov 19, 2018   Procedure with Armando Rico MD   [facility] (--)    [address] [facility] [address]   [phone]            Jan 02, 2019 11:10 AM CST   Return Visit with Yvonne Jimenez NP   [place]  [facility] ([facility])    [address] [address] [phone]              Who to contact     If you have questions or need follow up information about today's clinic visit or your schedule please contact [facility] [place] directly at [phone].  Normal or non-critical lab and imaging results will be communicated to you by MyChart, letter or phone within 4 business days after the clinic has received the results. If you do not hear from us within 7 days, please contact the clinic through MyChart or phone. If you have a critical or abnormal lab result, we will notify you by phone as soon as possible.  Submit refill requests through myseekit or call your pharmacy and they will forward the refill request to us. Please allow 3 business days for your refill to be completed.          Additional Information About Your Visit        [employer] Information     myseekit gives you secure access to your electronic health record. If you see a primary care provider, you can also send messages to your care team and make appointments. If you have questions, please call your primary care clinic.  If you do not have a primary care provider, please call [phone] and they will assist you.        Care EveryWhere ID     This is your Care EveryWhere ID. This could be used by other organizations to access your [facility] medical records  [id number]        Your Vitals Were     Pulse Temperature BMI (Body Mass Index)             76 97  F (36.1  C) (Tympanic) 23.88 kg/m2          Blood Pressure from Last 3 Encounters:   11/01/18 120/80   10/24/18 139/84   09/18/18 156/89    Weight from Last 3 Encounters:   11/01/18 181 lb (82.1 kg)   10/24/18 190 lb (86.2 kg)   09/18/18 190 lb 6.4 oz (86.4 kg)              We Performed the Following     Hemoglobin        Primary Care Provider Office Phone # Fax #    Dany Richardson [phone] [address] [place] [address]        Equal Access to Services     G[employer] TRINH : Hadii aad ku hadmaycolkelsy Arenas, waaxda luqadaha, qaybta tavonbolivardwain gaffney, kika chicas . So [facility] [phone].    ATENCIÓN: Si habla español, tiene a capps disposición servicios gratuitos de asistencia lingüística. Llame al [phone].    We comply with applicable federal civil rights laws and [place] laws. We do not discriminate on the basis of race, color, national origin, age, disability, sex, sexual orientation, or gender identity.            Thank you!     Thank you for choosing [facility] [place]  for your care. Our goal is always to provide you with excellent care. Hearing back from our patients is one way we can continue to improve our services. Please take a few minutes to complete the written survey that you may receive in the mail after your visit with us. Thank you!             Your Updated Medication List - Protect others around you: Learn how to safely use, store and throw away your medicines at www.disposemymeds.org.          This list is accurate as of 11/1/18  8:16 AM.  Always use your most recent med list.                   Brand Name Dispense Instructions for use Diagnosis    aspirin 81 MG tablet     100    ONE DAILY        IBUPROFEN PO      Take 200 mg by mouth        Multi-vitamin Tabs tablet   Generic drug:  multivitamin, therapeutic with minerals      1 TABLET DAILY        varenicline 0.5 MG X 11 & 1 MG X 42 tablet    CHANTIX STARTING MONTH NEHEMIAS    53 tablet    Take 0.5 mg tab daily for 3 days, then 0.5 mg tab twice daily for 4 days, then 1 mg twice daily.    Smoking addiction       VITAMIN C PO      Take 1 tablet by mouth.        VITAMIN D PO      Take 1 tablet by mouth.

## 2018-11-05 ENCOUNTER — THERAPY VISIT (OUTPATIENT)
Dept: PHYSICAL THERAPY | Facility: CLINIC | Age: 53
End: 2018-11-05
Payer: COMMERCIAL

## 2018-11-05 DIAGNOSIS — M54.16 LUMBAR RADICULOPATHY: ICD-10-CM

## 2018-11-05 PROCEDURE — 97140 MANUAL THERAPY 1/> REGIONS: CPT | Mod: GP | Performed by: PHYSICAL THERAPIST

## 2018-11-05 PROCEDURE — 97110 THERAPEUTIC EXERCISES: CPT | Mod: GP | Performed by: PHYSICAL THERAPIST

## 2018-11-05 PROCEDURE — 97112 NEUROMUSCULAR REEDUCATION: CPT | Mod: GP | Performed by: PHYSICAL THERAPIST

## 2018-11-07 ENCOUNTER — TELEPHONE (OUTPATIENT)
Dept: NEUROSURGERY | Facility: CLINIC | Age: 53
End: 2018-11-07

## 2018-11-07 NOTE — TELEPHONE ENCOUNTER
Type of surgery: Left L4-5 MICRODISCECTOMY   Location of surgery: [facility]  Date and time of surgery: 11/19/2018 at 9:45 AM   Surgeon: Dr. Rico  Pre-Op Appt Date: Discussed   Post-Op Appt Date: 01/02/2019    Packet sent out: Yes  Pre-cert/Authorization completed:  Yes  Date: 11/07/2018

## 2018-11-12 ENCOUNTER — THERAPY VISIT (OUTPATIENT)
Dept: PHYSICAL THERAPY | Facility: CLINIC | Age: 53
End: 2018-11-12
Payer: COMMERCIAL

## 2018-11-12 DIAGNOSIS — M54.16 LUMBAR RADICULOPATHY: ICD-10-CM

## 2018-11-12 PROCEDURE — 97110 THERAPEUTIC EXERCISES: CPT | Mod: GP | Performed by: PHYSICAL THERAPIST

## 2018-11-12 PROCEDURE — 97112 NEUROMUSCULAR REEDUCATION: CPT | Mod: GP | Performed by: PHYSICAL THERAPIST

## 2018-11-12 NOTE — MR AVS SNAPSHOT
After Visit Summary   11/12/2018    Kip Winn    MRN: [id number]           Patient Information     Date Of Birth          1965        Visit Information        Provider Department      11/12/2018 7:00 AM Keisha Ríos, PT [place] [facility]        Today's Diagnoses     Lumbar radiculopathy           Follow-ups after your visit        Your next 10 appointments already scheduled     Nov 19, 2018   Procedure with Armando Rico MD   [facility] (--)    [address]  [facility] [id number] [phone]            Jan 02, 2019 11:10 AM CST   Return Visit with Yvonne Jimenez NP   [facility] ([facility])    [address] [place] [id number] [phone]              Who to contact     If you have questions or need follow up information about today's clinic visit or your schedule please contact [facility] [place] [facility] directly at [phone].  Normal or non-critical lab and imaging results will be communicated to you by [employer], letter or phone within 4 business days after the clinic has received the results. If you do not hear from us within 7 days, please contact the clinic through N[employer] Labshart or phone. If you have a critical or abnormal lab result, we will notify you by phone as soon as possible.  Submit refill requests through Mixercast or call your pharmacy and they will forward the refill request to us. Please allow 3 business days for your refill to be completed.          Additional Information About Your Visit        [employer]shart Information     [employer] gives you secure access to your electronic health record. If you see a primary care provider, you can also send messages to your care team and make appointments. If you have questions, please call your primary care clinic.  If you do not have a  primary care provider, please call [phone] and they will assist you.        Care EveryWhere ID     This is your Care EveryWhere ID. This could be used by other organizations to access your [facility] medical records  [id number]         Blood Pressure from Last 3 Encounters:   11/01/18 120/80   10/24/18 139/84   09/18/18 156/89    Weight from Last 3 Encounters:   11/01/18 82.1 kg (181 lb)   10/24/18 86.2 kg (190 lb)   09/18/18 86.4 kg (190 lb 6.4 oz)              We Performed the Following     NEUROMUSCULAR RE-EDUCATION     THERAPEUTIC EXERCISES        Primary Care Provider Office Phone # Fax #    Dany Richardson [phone]       [address]  [place] [address]        Equal Access to Services     [facility]: Hadii aad gonzalez hadasho Solupe, waaxda luqadaha, qaybta kaalmada adeegyada, kika chicas . So [facility] [phone].    ATENCIÓN: Si habla español, tiene a capps disposición servicios gratuitos de asistencia lingüística. [facility] [phone].    We comply with applicable federal civil rights laws and [place] laws. We do not discriminate on the basis of race, color, national origin, age, disability, sex, sexual orientation, or gender identity.            Thank you!     Thank you for choosing [facility] [place] [facility]  for your care. Our goal is always to provide you with excellent care. Hearing back from our patients is one way we can continue to improve our services. Please take a few minutes to complete the written survey that you may receive in the mail after your visit with us. Thank you!             Your Updated Medication List - Protect others around you: Learn how to safely use, store and throw away your medicines at www.disposemymeds.org.          This list is accurate as of 11/12/18  7:39 AM.  Always use your most recent med list.                   Brand Name Dispense Instructions for use Diagnosis    aspirin  81 MG tablet     100    ONE DAILY        IBUPROFEN PO      Take 200 mg by mouth        Multi-vitamin Tabs tablet   Generic drug:  multivitamin, therapeutic with minerals      1 TABLET DAILY        varenicline 0.5 MG X 11 & 1 MG X 42 tablet    CHANTIX STARTING MONTH NEHEMIAS    53 tablet    Take 0.5 mg tab daily for 3 days, then 0.5 mg tab twice daily for 4 days, then 1 mg twice daily.    Smoking addiction       VITAMIN C PO      Take 1 tablet by mouth.        VITAMIN D PO      Take 1 tablet by mouth.

## 2018-11-12 NOTE — PROGRESS NOTES
Subjective:  HPI  Oswestry Score: 16 %                 Objective:  System    Physical Exam    General     ROS    Assessment/Plan:    DISCHARGE REPORT    Progress reporting period is from 11/1/18 to 11/12/18.       SUBJECTIVE  Subjective changes noted by patient:  11/12/18.  Subjective: Kip mentions the pain is still there. He is exercises as possible. He has general aching pain. He feels more pain in his thigh and buttock as comapred to lower leg and foot.     Current pain level is -Current Pain level: 3/10.     Previous pain level was    .   Changes in function:  Yes (See Goal flowsheet attached for changes in current functional level)  Adverse reaction to treatment or activity: None    OBJECTIVE  Changes noted in objective findings:  Yes,   Objective: Discussed after surgery care and when to return to therapy after consulting his provider.      ASSESSMENT/PLAN  Updated problem list and treatment plan: Diagnosis 1:  LBP    STG/LTGs have been met or progress has been made towards goals:  Yes (See Goal flow sheet completed today.)  Assessment of Progress: The patient's condition is improving.  The patient's condition has potential to improve.  Patient is meeting short term goals and is progressing towards long term goals.  Surgery 11/19/18  Self Management Plans:  Patient has been instructed in a home treatment program.  Patient  has been instructed in self management of symptoms.  I have re-evaluated this patient and find that the nature, scope, duration and intensity of the therapy is appropriate for the medical condition of the patient.  Kip continues to require the following intervention to meet STG and LTG's:  PT intervention is no longer required to meet STG/LTG.    Recommendations:  This patient is ready to be discharged from therapy and continue their home treatment program.    Please refer to the daily flowsheet for treatment today, total treatment time and time spent performing 1:1 timed  codes.

## 2018-11-16 NOTE — H&P (VIEW-ONLY)
[facility] [place]  [address] [address]  [phone]  Dept: [phone]    PRE-OP EVALUATION:  Today's date: 2018    Kip Winn (: 1965) presents for pre-operative evaluation assessment as requested by Dr. Rico.  He requires evaluation and anesthesia risk assessment prior to undergoing surgery/procedure for treatment of large central L4-5 herniated disc.    Proposed Surgery/ Procedure: Left L4-5 microdiscectomy  Date of Surgery/ Procedure:   Time of Surgery/ Procedure: 9:45am  Hospital/Surgical Facility: [facility]    Primary Physician: Dany Richardson  Type of Anesthesia Anticipated: to be determined    Patient has a Health Care Directive or Living Will:  YES    1. NO - Do you have a history of heart attack, stroke, stent, bypass or surgery on an artery in the head, neck, heart or legs?  2. NO - Do you ever have any pain or discomfort in your chest?  3. NO - Do you have a history of  Heart Failure?  4. NO - Are you troubled by shortness of breath when: walking on the level, up a slight hill or at night?  5. NO - Do you currently have a cold, bronchitis or other respiratory infection?  6. NO - Do you have a cough, shortness of breath or wheezing?  7. NO - Do you sometimes get pains in the calves of your legs when you walk?  8. NO - Do you or anyone in your family have previous history of blood clots?  9. NO - Do you or does anyone in your family have a serious bleeding problem such as prolonged bleeding following surgeries or cuts?  10. NO - Have you ever had problems with anemia or been told to take iron pills?  11. NO - Have you had any abnormal blood loss such as black, tarry or bloody stools, or abnormal vaginal bleeding?  12. NO - Have you ever had a blood transfusion?  13. NO - Have you or any of your relatives ever had problems with anesthesia?  14. NO - Do you have sleep apnea, excessive snoring or daytime drowsiness?  15. NO - Do you have any  prosthetic heart valves?  16. NO - Do you have prosthetic joints?  17. NO - Is there any chance that you may be pregnant?      HPI:     HPI related to upcoming procedure:       See problem list for active medical problems.  Problems all longstanding and stable, except as noted/documented.  See ROS for pertinent symptoms related to these conditions.                                                                                                                                                          .    MEDICAL HISTORY:     Patient Active Problem List    Diagnosis Date Noted     Smoker 07/01/2011     Priority: High     Herniated nucleus pulposus, L4-5 right 12/20/2017     Priority: Medium     Lumbar radiculopathy 12/20/2017     Priority: Medium     Hemangioma of forehead 07/19/2012     Priority: Medium     Low HDL (under 40) 07/19/2012     Priority: Medium     Family history of prostate cancer 07/01/2011     Priority: Medium     CARDIOVASCULAR SCREENING; LDL GOAL LESS THAN 130 10/31/2010     Priority: Medium      Past Medical History:   Diagnosis Date     Coronary artery disease      Fatigue      Fatigue 7/1/2011     Lumbar disc herniation     L4-5     Smoker 7/1/2011     Past Surgical History:   Procedure Laterality Date     COLONOSCOPY N/A 3/19/2018    Procedure: COLONOSCOPY;  colonoscopy;  Surgeon: Mya Arauz MD;  Location:  GI     EYE SURGERY      Lasik 5 years ago     NO HISTORY OF SURGERY       Current Outpatient Prescriptions   Medication Sig Dispense Refill     Ascorbic Acid (VITAMIN C PO) Take 1 tablet by mouth.       ASPIRIN 81 MG OR TABS ONE DAILY 100 3     IBUPROFEN PO Take 200 mg by mouth       MULTI-VITAMIN OR TABS 1 TABLET DAILY       varenicline (CHANTIX STARTING MONTH NEHEMIAS) 0.5 MG X 11 & 1 MG X 42 tablet Take 0.5 mg tab daily for 3 days, then 0.5 mg tab twice daily for 4 days, then 1 mg twice daily. 53 tablet 0     VITAMIN D PO Take 1 tablet by mouth.       OTC products: None, except as  noted above    Allergies   Allergen Reactions     Penicillins      Throat felt like it was closing and bloody nose      Latex Allergy: NO    Social History   Substance Use Topics     Smoking status: Current Every Day Smoker     Packs/day: 1.00     Years: 20.00     Types: Cigarettes     Smokeless tobacco: Never Used     Alcohol use 0.0 oz/week     0 Standard drinks or equivalent per week      Comment: 2 drinks a month     History   Drug Use No       REVIEW OF SYSTEMS:   CONSTITUTIONAL: NEGATIVE for fever, chills, change in weight  ENT/MOUTH: NEGATIVE for ear, mouth and throat problems  RESP: NEGATIVE for significant cough or SOB  CV: NEGATIVE for chest pain, palpitations or peripheral edema    EXAM:   /80  Pulse 76  Temp 97  F (36.1  C) (Tympanic)  Wt 181 lb (82.1 kg)  BMI 23.88 kg/m2  GENERAL APPEARANCE: healthy, alert and no distress  HENT: ear canals and TM's normal and nose and mouth without ulcers or lesions  RESP: lungs clear to auscultation - no rales, rhonchi or wheezes  CV: regular rate and rhythm, normal S1 S2, no S3 or S4 and no murmur, click or rub   ABDOMEN: soft, nontender, no HSM or masses and bowel sounds normal  NEURO: Normal strength and tone, sensory exam grossly normal, mentation intact and speech normal    DIAGNOSTICS:        Recent Labs   Lab Test  02/09/18   0900  10/07/16   0754   10/03/14   0814  07/29/14   1432  07/21/13   0705  07/19/13   0859  08/03/12   0803   HGB   --    --    --    --   14.4   --   15.6  15.3   PLT   --    --    --    --   248   --    --   246   NA  144  141   < >   --   140   --    --   141   POTASSIUM  4.2  4.1   < >   --   3.5   --    --   3.9   CR  0.77  0.77   < >   --   0.85   --    --   0.75   A1C   --    --    --   5.7   --   5.6   --    --     < > = values in this interval not displayed.        IMPRESSION:   Reason for surgery/procedure:   Diagnosis/reason for consult:   1. Preop general physical exam    2. Lumbar radiculopathy          The proposed  surgical procedure is considered LOW risk.    REVISED CARDIAC RISK INDEX  The patient has the following serious cardiovascular risks for perioperative complications such as (MI, PE, VFib and 3  AV Block):  No serious cardiac risks  INTERPRETATION: 0 risks: Class I (very low risk - 0.4% complication rate)    The patient has the following additional risks for perioperative complications:  No identified additional risks      ICD-10-CM    1. Preop general physical exam Z01.818 Hemoglobin   2. Lumbar radiculopathy M54.16 Hemoglobin       RECOMMENDATIONS:         APPROVAL GIVEN to proceed with proposed procedure, without further diagnostic evaluation.  Advised to hold aspirin for at least 1 week prior to surgery.       Signed Electronically by: Dany Richardson MD    Copy of this evaluation report is provided to requesting physician.    [facility] Preop Guidelines    Revised Cardiac Risk Index

## 2018-11-19 ENCOUNTER — HOSPITAL ENCOUNTER (OUTPATIENT)
Facility: CLINIC | Age: 53
Discharge: HOME | End: 2018-11-19
Attending: NEUROLOGICAL SURGERY | Admitting: NEUROLOGICAL SURGERY
Payer: COMMERCIAL

## 2018-11-19 ENCOUNTER — SURGERY (OUTPATIENT)
Age: 53
End: 2018-11-19

## 2018-11-19 ENCOUNTER — ANESTHESIA EVENT (OUTPATIENT)
Dept: SURGERY | Facility: CLINIC | Age: 53
End: 2018-11-19
Payer: COMMERCIAL

## 2018-11-19 ENCOUNTER — ANESTHESIA (OUTPATIENT)
Dept: SURGERY | Facility: CLINIC | Age: 53
End: 2018-11-19
Payer: COMMERCIAL

## 2018-11-19 ENCOUNTER — APPOINTMENT (OUTPATIENT)
Dept: GENERAL RADIOLOGY | Facility: CLINIC | Age: 53
End: 2018-11-19
Attending: NEUROLOGICAL SURGERY
Payer: COMMERCIAL

## 2018-11-19 VITALS
OXYGEN SATURATION: 100 % | TEMPERATURE: 98 F | HEIGHT: 73 IN | HEART RATE: 71 BPM | DIASTOLIC BLOOD PRESSURE: 95 MMHG | SYSTOLIC BLOOD PRESSURE: 142 MMHG | RESPIRATION RATE: 16 BRPM | WEIGHT: 180 LBS | BODY MASS INDEX: 23.86 KG/M2

## 2018-11-19 DIAGNOSIS — Z98.890 S/P LUMBAR MICRODISCECTOMY: Primary | ICD-10-CM

## 2018-11-19 PROCEDURE — 37000008 ZZH ANESTHESIA TECHNICAL FEE, 1ST 30 MIN: Performed by: NEUROLOGICAL SURGERY

## 2018-11-19 PROCEDURE — 36000069 ZZH SURGERY LEVEL 5 EA 15 ADDTL MIN: Performed by: NEUROLOGICAL SURGERY

## 2018-11-19 PROCEDURE — 25000128 H RX IP 250 OP 636: Performed by: ANESTHESIOLOGY

## 2018-11-19 PROCEDURE — 37000009 ZZH ANESTHESIA TECHNICAL FEE, EACH ADDTL 15 MIN: Performed by: NEUROLOGICAL SURGERY

## 2018-11-19 PROCEDURE — 40000306 ZZH STATISTIC PRE PROC ASSESS II: Performed by: NEUROLOGICAL SURGERY

## 2018-11-19 PROCEDURE — 27210794 ZZH OR GENERAL SUPPLY STERILE: Performed by: NEUROLOGICAL SURGERY

## 2018-11-19 PROCEDURE — 25000128 H RX IP 250 OP 636: Performed by: NURSE ANESTHETIST, CERTIFIED REGISTERED

## 2018-11-19 PROCEDURE — 63030 LAMOT DCMPRN NRV RT 1 LMBR: CPT | Mod: LT | Performed by: NEUROLOGICAL SURGERY

## 2018-11-19 PROCEDURE — 25800025 ZZH RX 258: Performed by: NEUROLOGICAL SURGERY

## 2018-11-19 PROCEDURE — 25000300 ZZH OR RX SURGIFLO HEMOSTATIC MATRIX 10ML 199102S OPNP: Performed by: NEUROLOGICAL SURGERY

## 2018-11-19 PROCEDURE — 36000071 ZZH SURGERY LEVEL 5 W FLUORO 1ST 30 MIN: Performed by: NEUROLOGICAL SURGERY

## 2018-11-19 PROCEDURE — 25000125 ZZHC RX 250: Performed by: NEUROLOGICAL SURGERY

## 2018-11-19 PROCEDURE — 25000128 H RX IP 250 OP 636: Performed by: NEUROLOGICAL SURGERY

## 2018-11-19 PROCEDURE — 25000125 ZZHC RX 250: Performed by: ANESTHESIOLOGY

## 2018-11-19 PROCEDURE — 71000027 ZZH RECOVERY PHASE 2 EACH 15 MINS: Performed by: NEUROLOGICAL SURGERY

## 2018-11-19 PROCEDURE — 71000014 ZZH RECOVERY PHASE 1 LEVEL 2 FIRST HR: Performed by: NEUROLOGICAL SURGERY

## 2018-11-19 PROCEDURE — 25000566 ZZH SEVOFLURANE, EA 15 MIN: Performed by: NEUROLOGICAL SURGERY

## 2018-11-19 PROCEDURE — 40000985 XR LUMBAR SPINE PORT 1 VW: Mod: TC

## 2018-11-19 PROCEDURE — 25000125 ZZHC RX 250: Performed by: NURSE ANESTHETIST, CERTIFIED REGISTERED

## 2018-11-19 RX ORDER — SODIUM CHLORIDE, SODIUM LACTATE, POTASSIUM CHLORIDE, CALCIUM CHLORIDE 600; 310; 30; 20 MG/100ML; MG/100ML; MG/100ML; MG/100ML
INJECTION, SOLUTION INTRAVENOUS CONTINUOUS
Status: DISCONTINUED | OUTPATIENT
Start: 2018-11-19 | End: 2018-11-19 | Stop reason: HOSPADM

## 2018-11-19 RX ORDER — GLYCOPYRROLATE 0.2 MG/ML
INJECTION, SOLUTION INTRAMUSCULAR; INTRAVENOUS PRN
Status: DISCONTINUED | OUTPATIENT
Start: 2018-11-19 | End: 2018-11-19

## 2018-11-19 RX ORDER — NEOSTIGMINE METHYLSULFATE 1 MG/ML
VIAL (ML) INJECTION PRN
Status: DISCONTINUED | OUTPATIENT
Start: 2018-11-19 | End: 2018-11-19

## 2018-11-19 RX ORDER — CLINDAMYCIN PHOSPHATE 900 MG/50ML
900 INJECTION, SOLUTION INTRAVENOUS
Status: COMPLETED | OUTPATIENT
Start: 2018-11-19 | End: 2018-11-19

## 2018-11-19 RX ORDER — CLINDAMYCIN PHOSPHATE 900 MG/50ML
900 INJECTION, SOLUTION INTRAVENOUS SEE ADMIN INSTRUCTIONS
Status: DISCONTINUED | OUTPATIENT
Start: 2018-11-19 | End: 2018-11-19 | Stop reason: HOSPADM

## 2018-11-19 RX ORDER — DEXAMETHASONE SODIUM PHOSPHATE 4 MG/ML
INJECTION, SOLUTION INTRA-ARTICULAR; INTRALESIONAL; INTRAMUSCULAR; INTRAVENOUS; SOFT TISSUE PRN
Status: DISCONTINUED | OUTPATIENT
Start: 2018-11-19 | End: 2018-11-19

## 2018-11-19 RX ORDER — KETOROLAC TROMETHAMINE 30 MG/ML
30 INJECTION, SOLUTION INTRAMUSCULAR; INTRAVENOUS ONCE
Status: COMPLETED | OUTPATIENT
Start: 2018-11-19 | End: 2018-11-19

## 2018-11-19 RX ORDER — BUPIVACAINE HYDROCHLORIDE AND EPINEPHRINE 5; 5 MG/ML; UG/ML
INJECTION, SOLUTION PERINEURAL PRN
Status: DISCONTINUED | OUTPATIENT
Start: 2018-11-19 | End: 2018-11-19 | Stop reason: HOSPADM

## 2018-11-19 RX ORDER — LIDOCAINE HYDROCHLORIDE 10 MG/ML
INJECTION, SOLUTION INFILTRATION; PERINEURAL PRN
Status: DISCONTINUED | OUTPATIENT
Start: 2018-11-19 | End: 2018-11-19

## 2018-11-19 RX ORDER — PROMETHAZINE HYDROCHLORIDE 25 MG/ML
25 INJECTION INTRAMUSCULAR; INTRAVENOUS ONCE
Status: COMPLETED | OUTPATIENT
Start: 2018-11-19 | End: 2018-11-19

## 2018-11-19 RX ORDER — ALBUTEROL SULFATE 0.83 MG/ML
2.5 SOLUTION RESPIRATORY (INHALATION) EVERY 4 HOURS PRN
Status: DISCONTINUED | OUTPATIENT
Start: 2018-11-19 | End: 2018-11-19 | Stop reason: HOSPADM

## 2018-11-19 RX ORDER — DIAZEPAM 5 MG
5 TABLET ORAL EVERY 6 HOURS PRN
Qty: 30 TABLET | Refills: 0 | Status: SHIPPED | OUTPATIENT
Start: 2018-11-19 | End: 2019-02-19

## 2018-11-19 RX ORDER — EPHEDRINE SULFATE 50 MG/ML
25 INJECTION, SOLUTION INTRAVENOUS ONCE
Status: COMPLETED | OUTPATIENT
Start: 2018-11-19 | End: 2018-11-19

## 2018-11-19 RX ORDER — DIMENHYDRINATE 50 MG/ML
25 INJECTION, SOLUTION INTRAMUSCULAR; INTRAVENOUS
Status: DISCONTINUED | OUTPATIENT
Start: 2018-11-19 | End: 2018-11-19 | Stop reason: HOSPADM

## 2018-11-19 RX ORDER — FENTANYL CITRATE 50 UG/ML
25-50 INJECTION, SOLUTION INTRAMUSCULAR; INTRAVENOUS
Status: DISCONTINUED | OUTPATIENT
Start: 2018-11-19 | End: 2018-11-19 | Stop reason: HOSPADM

## 2018-11-19 RX ORDER — LABETALOL HYDROCHLORIDE 5 MG/ML
10 INJECTION, SOLUTION INTRAVENOUS
Status: DISCONTINUED | OUTPATIENT
Start: 2018-11-19 | End: 2018-11-19 | Stop reason: HOSPADM

## 2018-11-19 RX ORDER — METOPROLOL TARTRATE 1 MG/ML
INJECTION, SOLUTION INTRAVENOUS PRN
Status: DISCONTINUED | OUTPATIENT
Start: 2018-11-19 | End: 2018-11-19

## 2018-11-19 RX ORDER — DIAZEPAM 10 MG/2ML
2.5 INJECTION, SOLUTION INTRAMUSCULAR; INTRAVENOUS
Status: DISCONTINUED | OUTPATIENT
Start: 2018-11-19 | End: 2018-11-19 | Stop reason: HOSPADM

## 2018-11-19 RX ORDER — ONDANSETRON 4 MG/1
4 TABLET, ORALLY DISINTEGRATING ORAL EVERY 30 MIN PRN
Status: DISCONTINUED | OUTPATIENT
Start: 2018-11-19 | End: 2018-11-19 | Stop reason: HOSPADM

## 2018-11-19 RX ORDER — MEPERIDINE HYDROCHLORIDE 50 MG/ML
12.5 INJECTION INTRAMUSCULAR; INTRAVENOUS; SUBCUTANEOUS EVERY 5 MIN PRN
Status: DISCONTINUED | OUTPATIENT
Start: 2018-11-19 | End: 2018-11-19 | Stop reason: HOSPADM

## 2018-11-19 RX ORDER — ONDANSETRON 2 MG/ML
4 INJECTION INTRAMUSCULAR; INTRAVENOUS EVERY 30 MIN PRN
Status: DISCONTINUED | OUTPATIENT
Start: 2018-11-19 | End: 2018-11-19 | Stop reason: HOSPADM

## 2018-11-19 RX ORDER — HYDROMORPHONE HYDROCHLORIDE 2 MG/1
2-4 TABLET ORAL EVERY 4 HOURS PRN
Qty: 60 TABLET | Refills: 0 | Status: SHIPPED | OUTPATIENT
Start: 2018-11-19 | End: 2019-02-19

## 2018-11-19 RX ORDER — HYDROMORPHONE HYDROCHLORIDE 1 MG/ML
.3-.5 INJECTION, SOLUTION INTRAMUSCULAR; INTRAVENOUS; SUBCUTANEOUS EVERY 5 MIN PRN
Status: DISCONTINUED | OUTPATIENT
Start: 2018-11-19 | End: 2018-11-19 | Stop reason: HOSPADM

## 2018-11-19 RX ORDER — PROPOFOL 10 MG/ML
INJECTION, EMULSION INTRAVENOUS PRN
Status: DISCONTINUED | OUTPATIENT
Start: 2018-11-19 | End: 2018-11-19

## 2018-11-19 RX ORDER — NALOXONE HYDROCHLORIDE 0.4 MG/ML
.1-.4 INJECTION, SOLUTION INTRAMUSCULAR; INTRAVENOUS; SUBCUTANEOUS
Status: DISCONTINUED | OUTPATIENT
Start: 2018-11-19 | End: 2018-11-19 | Stop reason: HOSPADM

## 2018-11-19 RX ORDER — ONDANSETRON 2 MG/ML
INJECTION INTRAMUSCULAR; INTRAVENOUS PRN
Status: DISCONTINUED | OUTPATIENT
Start: 2018-11-19 | End: 2018-11-19

## 2018-11-19 RX ORDER — FENTANYL CITRATE 50 UG/ML
100 INJECTION, SOLUTION INTRAMUSCULAR; INTRAVENOUS ONCE
Status: COMPLETED | OUTPATIENT
Start: 2018-11-19 | End: 2018-11-19

## 2018-11-19 RX ADMIN — PROMETHAZINE HYDROCHLORIDE 25 MG: 25 INJECTION INTRAMUSCULAR; INTRAVENOUS at 14:05

## 2018-11-19 RX ADMIN — GENTAMICIN SULFATE 600 ML: 40 INJECTION, SOLUTION INTRAMUSCULAR; INTRAVENOUS at 11:27

## 2018-11-19 RX ADMIN — CLINDAMYCIN PHOSPHATE 900 MG: 18 INJECTION, SOLUTION INTRAVENOUS at 10:42

## 2018-11-19 RX ADMIN — BUPIVACAINE HYDROCHLORIDE AND EPINEPHRINE BITARTRATE 20 ML: 5; .005 INJECTION, SOLUTION EPIDURAL; INTRACAUDAL; PERINEURAL at 11:51

## 2018-11-19 RX ADMIN — PHENYLEPHRINE HYDROCHLORIDE 200 MCG: 10 INJECTION, SOLUTION INTRAMUSCULAR; INTRAVENOUS; SUBCUTANEOUS at 11:03

## 2018-11-19 RX ADMIN — ONDANSETRON 4 MG: 2 INJECTION INTRAMUSCULAR; INTRAVENOUS at 13:32

## 2018-11-19 RX ADMIN — PHENYLEPHRINE HYDROCHLORIDE 200 MCG: 10 INJECTION, SOLUTION INTRAMUSCULAR; INTRAVENOUS; SUBCUTANEOUS at 11:10

## 2018-11-19 RX ADMIN — PHENYLEPHRINE HYDROCHLORIDE 100 MCG: 10 INJECTION, SOLUTION INTRAMUSCULAR; INTRAVENOUS; SUBCUTANEOUS at 11:43

## 2018-11-19 RX ADMIN — ONDANSETRON 4 MG: 2 INJECTION INTRAMUSCULAR; INTRAVENOUS at 10:44

## 2018-11-19 RX ADMIN — PHENYLEPHRINE HYDROCHLORIDE 100 MCG: 10 INJECTION, SOLUTION INTRAMUSCULAR; INTRAVENOUS; SUBCUTANEOUS at 10:58

## 2018-11-19 RX ADMIN — PHENYLEPHRINE HYDROCHLORIDE 200 MCG: 10 INJECTION, SOLUTION INTRAMUSCULAR; INTRAVENOUS; SUBCUTANEOUS at 11:19

## 2018-11-19 RX ADMIN — METOPROLOL TARTRATE 2.5 MG: 5 INJECTION INTRAVENOUS at 10:55

## 2018-11-19 RX ADMIN — LIDOCAINE HYDROCHLORIDE 30 MG: 10 INJECTION, SOLUTION INFILTRATION; PERINEURAL at 10:44

## 2018-11-19 RX ADMIN — PROPOFOL 200 MG: 10 INJECTION, EMULSION INTRAVENOUS at 10:44

## 2018-11-19 RX ADMIN — SODIUM CHLORIDE, POTASSIUM CHLORIDE, SODIUM LACTATE AND CALCIUM CHLORIDE: 600; 310; 30; 20 INJECTION, SOLUTION INTRAVENOUS at 11:23

## 2018-11-19 RX ADMIN — Medication 4 MG: at 11:55

## 2018-11-19 RX ADMIN — EPHEDRINE SULFATE 25 MG: 50 INJECTION, SOLUTION INTRAVENOUS at 14:05

## 2018-11-19 RX ADMIN — HYDROMORPHONE HYDROCHLORIDE 1 MG: 1 INJECTION, SOLUTION INTRAMUSCULAR; INTRAVENOUS; SUBCUTANEOUS at 10:44

## 2018-11-19 RX ADMIN — SODIUM CHLORIDE, POTASSIUM CHLORIDE, SODIUM LACTATE AND CALCIUM CHLORIDE: 600; 310; 30; 20 INJECTION, SOLUTION INTRAVENOUS at 10:30

## 2018-11-19 RX ADMIN — GLYCOPYRROLATE 0.2 MG: 0.2 INJECTION, SOLUTION INTRAMUSCULAR; INTRAVENOUS at 10:44

## 2018-11-19 RX ADMIN — FENTANYL CITRATE 100 MCG: 50 INJECTION, SOLUTION INTRAMUSCULAR; INTRAVENOUS at 10:44

## 2018-11-19 RX ADMIN — KETOROLAC TROMETHAMINE 30 MG: 30 INJECTION, SOLUTION INTRAMUSCULAR at 10:44

## 2018-11-19 RX ADMIN — PHENYLEPHRINE HYDROCHLORIDE 100 MCG: 10 INJECTION, SOLUTION INTRAMUSCULAR; INTRAVENOUS; SUBCUTANEOUS at 11:39

## 2018-11-19 RX ADMIN — GLYCOPYRROLATE 0.6 MG: 0.2 INJECTION, SOLUTION INTRAMUSCULAR; INTRAVENOUS at 11:55

## 2018-11-19 RX ADMIN — DEXAMETHASONE SODIUM PHOSPHATE 8 MG: 4 INJECTION, SOLUTION INTRA-ARTICULAR; INTRALESIONAL; INTRAMUSCULAR; INTRAVENOUS; SOFT TISSUE at 10:44

## 2018-11-19 RX ADMIN — PHENYLEPHRINE HYDROCHLORIDE 200 MCG: 10 INJECTION, SOLUTION INTRAMUSCULAR; INTRAVENOUS; SUBCUTANEOUS at 11:49

## 2018-11-19 RX ADMIN — MIDAZOLAM 2 MG: 1 INJECTION INTRAMUSCULAR; INTRAVENOUS at 10:37

## 2018-11-19 RX ADMIN — PHENYLEPHRINE HYDROCHLORIDE 100 MCG: 10 INJECTION, SOLUTION INTRAMUSCULAR; INTRAVENOUS; SUBCUTANEOUS at 10:54

## 2018-11-19 RX ADMIN — ROCURONIUM BROMIDE 50 MG: 10 INJECTION INTRAVENOUS at 10:44

## 2018-11-19 ASSESSMENT — LIFESTYLE VARIABLES: TOBACCO_USE: 1

## 2018-11-19 NOTE — ANESTHESIA POSTPROCEDURE EVALUATION
Patient: Kip Winn    Procedure(s):  Left L4-5 microdiscectomy    Diagnosis:large central L4-5 herniated disc  Diagnosis Additional Information: Dr. Rico  Large left L4-5 herniated disk with LLE > RLE radiculopathy        Anesthesia Type:  General, ETT    Note:  Anesthesia Post Evaluation    Patient location during evaluation: PACU  Patient participation: Able to participate in evaluation but full recovery from regional anesthesia has not yet ocurrred but is anticipated to occur within 48 hours  Level of consciousness: awake  Pain management: adequate  Airway patency: patent  Cardiovascular status: acceptable  Respiratory status: acceptable  Hydration status: acceptable  PONV: none             Last vitals:  Vitals:    11/19/18 1215 11/19/18 1220 11/19/18 1225   BP: 125/68 121/77 133/85   Pulse:      Resp:  9 16   Temp:  97.5  F (36.4  C)    SpO2:  100% 98%         Electronically Signed By: Matt Webster MD  November 19, 2018  12:31 PM

## 2018-11-19 NOTE — DISCHARGE INSTRUCTIONS
SPINE SURGERY DISCHARGE INSTRUCTIONS  DR. CATRACHITA IBARRA M.D.  [phone]    1.  NO LIFTING OF MORE THAT 10 POUNDS UNTIL FOLLOW-UP VISIT IN 6-8 WEEKS.    2.  OK TO REMOVE DRESSING IN 24 HOURS AND SHOWER OR BATHE, BUT DO NOT SCRUB OR SUBMERGE INCISION UNDER WATER FOR 6 WEEKS.  IF YOU HAVE DRAINAGE FROM THE INCISION, YOU MAY REPLACE THE DRESSING AS NEEDED.    3.  OK TO USE ICE PACKS TO THE BACK AREA FOR 20 MINUTES ON AND 20 MINUTES OFF FOR 24-48 HOURS AFTER SURGERY.  AVOID ICE CONTACT DIRECTLY TO THE SKIN OR INCISION.    4.  OK TO WALK AS MUCH AS TOLERATED.    5.  NO CONTACT SPORTS UNTIL FOLLOW-UP VISIT.    6.  NO HIGH IMPACT ACTIVITIES SUCH AS RUNNING/JOGGING, SNOWMOBILE OR FOUR CHAVEZ RIDING OR ANY OTHER RECREACTIONAL VEHICLES.    CALL MY OFFICE -[phone] FOR INCREASING REDNESS, SWELLING OR PUS DRAINING FROM THE INCISION.  CALL IF YOU HAVE INCREASED PAIN OR ANY OTHER QUESTIONS OR CONCERNS.          GENERAL ANESTHESIA OR SEDATION ADULT DISCHARGE INSTRUCTIONS   SPECIAL PRECAUTIONS FOR 24 HOURS AFTER SURGERY    IT IS NOT UNUSUAL TO FEEL LIGHT-HEADED OR FAINT, UP TO 24 HOURS AFTER SURGERY OR WHILE TAKING PAIN MEDICATION.  IF YOU HAVE THESE SYMPTOMS; SIT FOR A FEW MINUTES BEFORE STANDING AND HAVE SOMEONE ASSIST YOU WHEN YOU GET UP TO WALK OR USE THE BATHROOM.    YOU SHOULD REST AND RELAX FOR THE NEXT 24 HOURS AND YOU MUST MAKE ARRANGEMENTS TO HAVE SOMEONE STAY WITH YOU FOR AT LEAST 24 HOURS AFTER YOUR DISCHARGE.  AVOID HAZARDOUS AND STRENUOUS ACTIVITIES.  DO NOT MAKE IMPORTANT DECISIONS FOR 24 HOURS.    DO NOT DRIVE ANY VEHICLE OR OPERATE MECHANICAL EQUIPMENT FOR 24 HOURS FOLLOWING THE END OF YOUR SURGERY.  EVEN THOUGH YOU MAY FEEL NORMAL, YOUR REACTIONS MAY BE AFFECTED BY THE MEDICATION YOU HAVE RECEIVED.    DO NOT DRINK ALCOHOLIC BEVERAGES FOR 24 HOURS FOLLOWING YOUR SURGERY.    DRINK CLEAR LIQUIDS (APPLE JUICE, GINGER ALE, 7-UP, BROTH, ETC.).  PROGRESS TO YOUR REGULAR DIET AS YOU FEEL ABLE.    YOU MAY HAVE  A DRY MOUTH, A SORE THROAT, MUSCLES ACHES OR TROUBLE SLEEPING.  THESE SHOULD GO AWAY AFTER 24 HOURS.    CALL YOUR DOCTOR FOR ANY OF THE FOLLOWING:  SIGNS OF INFECTION (FEVER, GROWING TENDERNESS AT THE SURGERY SITE, A LARGE AMOUNT OF DRAINAGE OR BLEEDING, SEVERE PAIN, FOUL-SMELLING DRAINAGE, REDNESS OR SWELLING.    IT HAS BEEN OVER 8 TO 10 HOURS SINCE SURGERY AND YOU ARE STILL NOT ABLE TO URINATE (PASS WATER).       Medications you received:  You received Toradol, an IV form of ibuprofen (Motrin) at 10:45am.  Do not take any ibuprofen products until 4:45pm.

## 2018-11-19 NOTE — ANESTHESIA CARE TRANSFER NOTE
Patient: Kip Winn    Procedure(s):  Left L4-5 microdiscectomy    Diagnosis: large central L4-5 herniated disc  Diagnosis Additional Information: No value filed.    Anesthesia Type:   General, ETT     Note:  Airway :Face Mask  Patient transferred to:PACU  Comments: Patient oral suctioned. Patient with spontaneous respirations and adequate tidal volumes. Patient awake and responsive. Extubated in OR to 8 L face tent. To PACU ventilating well. VSS. Report given.Handoff Report: Identifed the Patient, Identified the Reponsible Provider, Reviewed the pertinent medical history, Discussed the surgical course, Reviewed Intra-OP anesthesia mangement and issues during anesthesia, Set expectations for post-procedure period and Allowed opportunity for questions and acknowledgement of understanding      Vitals: (Last set prior to Anesthesia Care Transfer)    CRNA VITALS  11/19/2018 1145 - 11/19/2018 1222      11/19/2018             Pulse: 104    SpO2: 98 %    Resp Rate (observed): (!)  4                Electronically Signed By: DEEDEE Hyde CRNA  November 19, 2018  12:22 PM

## 2018-11-19 NOTE — ANESTHESIA PREPROCEDURE EVALUATION
PAC NOTE:       ANESTHESIA PRE EVALUATION:  Anesthesia Evaluation     . Pt has had prior anesthetic. Type: General    No history of anesthetic complications          ROS/MED HX    ENT/Pulmonary:     (+)tobacco use, Current use 1 packs/day  , . .    Neurologic:     (+)neuropathy - lumbar radiculopathy,     Cardiovascular:     (+) --CAD, --. : . . . :. .       METS/Exercise Tolerance:     Hematologic:  - neg hematologic  ROS       Musculoskeletal:  - neg musculoskeletal ROS       GI/Hepatic:  - neg GI/hepatic ROS       Renal/Genitourinary:  - ROS Renal section negative       Endo:  - neg endo ROS       Psychiatric:  - neg psychiatric ROS       Infectious Disease:  - neg infectious disease ROS       Malignancy:      - no malignancy   Other:    - neg other ROS                 Physical Exam  Normal systems: cardiovascular, pulmonary and dental    Airway   Mallampati: II  TM distance: >3 FB  Neck ROM: full    Dental     Cardiovascular   Rhythm and rate: regular and normal      Pulmonary    breath sounds clear to auscultation    Other findings: Lab Test        11/01/18 07/29/14 07/19/13 08/03/12 07/19/12                       0813          1432          0859          0803          0929          WBC           --          6.7           --          7.1          7.4           HGB          14.5         14.4         15.6         15.3         16.0          MCV           --          88            --          84           84            PLT           --          248           --          246          253            Lab Test        02/09/18     10/07/16     10/06/15                       0900          0754          0824          NA           144          141          141           POTASSIUM    4.2          4.1          4.1           CHLORIDE     108          109          105           CO2          27           27           29            BUN          17           13           13            CR           0.77          0.77         0.80          ANIONGAP     9            5            7             CINDY          9.0          8.9          9.0           GLC          99           108*         106*                 ECG NSR with possibly shortened OK         Anesthesia Plan      History & Physical Review  History and physical reviewed and following examination; no interval change.    ASA Status:  2 .    NPO Status:  > 8 hours    Plan for General and ETT with Intravenous induction. Maintenance will be Balanced.    PONV prophylaxis:  Ondansetron (or other 5HT-3) and Dexamethasone or Solumedrol       Postoperative Care  Postoperative pain management:  IV analgesics, Oral pain medications and Multi-modal analgesia.      Consents  Anesthetic plan, risks, benefits and alternatives discussed with:  Patient.  Use of blood products discussed: No .   .                            .

## 2018-11-19 NOTE — IP AVS SNAPSHOT
[facility] PreOP/PostOP    [address]    [facility] [address]    Phone:  [phone]    Fax:  [phone]                                       After Visit Summary   11/19/2018    Kip Winn    MRN: [id number]           After Visit Summary Signature Page     I have received my discharge instructions, and my questions have been answered. I have discussed any challenges I see with this plan with the nurse or doctor.    ..........................................................................................................................................  Patient/Patient Representative Signature      ..........................................................................................................................................  Patient Representative Print Name and Relationship to Patient    ..................................................               ................................................  Date                                   Time    ..........................................................................................................................................  Reviewed by Signature/Title    ...................................................              ..............................................  Date                                               Time          22EPIC Rev 08/18

## 2018-11-19 NOTE — IP AVS SNAPSHOT
MRN:[id number]                      After Visit Summary   11/19/2018    Kip Winn    MRN: [id number]           Thank you!     Thank you for choosing [facility] for your care. Our goal is always to provide you with excellent care. Hearing back from our patients is one way we can continue to improve our services. Please take a few minutes to complete the written survey that you may receive in the mail after you visit. If you would like to speak to someone directly about your visit please contact Patient Relations at [phone]. Thank you!          Patient Information     Date Of Birth          1965        About your hospital stay     You were admitted on:  November 19, 2018 You last received care in the:  [facility] PreOP/PostOP    You were discharged on:  November 19, 2018        Reason for your hospital stay       53 year old male s/p lumbar microdiscectomy                  Who to Call     For medical emergencies, please call 911.  For non-urgent questions about your medical care, please call your primary care provider or clinic, [phone]  For questions related to your surgery, please call your surgery clinic        Attending Provider     Provider Specialty    Armando Rico MD Neurosurgery       Primary Care Provider Office Phone # Fax #    Dany Richardson [phone]      After Care Instructions     Activity       Your activity upon discharge: no heavy lifting for 6 weeks            Diet       Resume normal diet                  Follow-up Appointments     Follow-up and recommended labs and tests        Neurosurgery appointment with Qi Hanna CNP or Yvonne Jimenez CNP at [facility] in 4-6 weeks, call [phone] for appointment                  Your next 10 appointments already scheduled     Jan 02, 2019 11:10 AM CST   Return Visit with Yvonne Jimenez NP   [facility] (Midnight  [facility]    [address] [address] [phone]              Further instructions from your care team       SPINE SURGERY DISCHARGE INSTRUCTIONS  DR. CATRACHITA IBARRA M.D.  [phone]    1.  NO LIFTING OF MORE THAT 10 POUNDS UNTIL FOLLOW-UP VISIT IN 6-8 WEEKS.    2.  OK TO REMOVE DRESSING IN 24 HOURS AND SHOWER OR BATHE, BUT DO NOT SCRUB OR SUBMERGE INCISION UNDER WATER FOR 6 WEEKS.  IF YOU HAVE DRAINAGE FROM THE INCISION, YOU MAY REPLACE THE DRESSING AS NEEDED.    3.  OK TO USE ICE PACKS TO THE BACK AREA FOR 20 MINUTES ON AND 20 MINUTES OFF FOR 24-48 HOURS AFTER SURGERY.  AVOID ICE CONTACT DIRECTLY TO THE SKIN OR INCISION.    4.  OK TO WALK AS MUCH AS TOLERATED.    5.  NO CONTACT SPORTS UNTIL FOLLOW-UP VISIT.    6.  NO HIGH IMPACT ACTIVITIES SUCH AS RUNNING/JOGGING, SNOWMOBILE OR FOUR CHAVEZ RIDING OR ANY OTHER RECREACTIONAL VEHICLES.    CALL MY OFFICE [phone] FOR INCREASING REDNESS, SWELLING OR PUS DRAINING FROM THE INCISION.  CALL IF YOU HAVE INCREASED PAIN OR ANY OTHER QUESTIONS OR CONCERNS.          GENERAL ANESTHESIA OR SEDATION ADULT DISCHARGE INSTRUCTIONS   SPECIAL PRECAUTIONS FOR 24 HOURS AFTER SURGERY    IT IS NOT UNUSUAL TO FEEL LIGHT-HEADED OR FAINT, UP TO 24 HOURS AFTER SURGERY OR WHILE TAKING PAIN MEDICATION.  IF YOU HAVE THESE SYMPTOMS; SIT FOR A FEW MINUTES BEFORE STANDING AND HAVE SOMEONE ASSIST YOU WHEN YOU GET UP TO WALK OR USE THE BATHROOM.    YOU SHOULD REST AND RELAX FOR THE NEXT 24 HOURS AND YOU MUST MAKE ARRANGEMENTS TO HAVE SOMEONE STAY WITH YOU FOR AT LEAST 24 HOURS AFTER YOUR DISCHARGE.  AVOID HAZARDOUS AND STRENUOUS ACTIVITIES.  DO NOT MAKE IMPORTANT DECISIONS FOR 24 HOURS.    DO NOT DRIVE ANY VEHICLE OR OPERATE MECHANICAL EQUIPMENT FOR 24 HOURS FOLLOWING THE END OF YOUR SURGERY.  EVEN THOUGH YOU MAY FEEL NORMAL, YOUR REACTIONS MAY BE AFFECTED BY THE MEDICATION YOU HAVE RECEIVED.    DO NOT DRINK ALCOHOLIC BEVERAGES FOR 24 HOURS  "FOLLOWING YOUR SURGERY.    DRINK CLEAR LIQUIDS (APPLE JUICE, GINGER ALE, 7-UP, BROTH, ETC.).  PROGRESS TO YOUR REGULAR DIET AS YOU FEEL ABLE.    YOU MAY HAVE A DRY MOUTH, A SORE THROAT, MUSCLES ACHES OR TROUBLE SLEEPING.  THESE SHOULD GO AWAY AFTER 24 HOURS.    CALL YOUR DOCTOR FOR ANY OF THE FOLLOWING:  SIGNS OF INFECTION (FEVER, GROWING TENDERNESS AT THE SURGERY SITE, A LARGE AMOUNT OF DRAINAGE OR BLEEDING, SEVERE PAIN, FOUL-SMELLING DRAINAGE, REDNESS OR SWELLING.    IT HAS BEEN OVER 8 TO 10 HOURS SINCE SURGERY AND YOU ARE STILL NOT ABLE TO URINATE (PASS WATER).       Medications you received:  You received Toradol, an IV form of ibuprofen (Motrin) at 10:45am.  Do not take any ibuprofen products until 4:45pm.        Pending Results     No orders found from 11/17/2018 to 11/20/2018.            Admission Information     Date & Time Provider Department Dept. Phone    11/19/2018 Armando Rico MD [facility] [place] [facility] PreOP/PostOP [phone]      Your Vitals Were     Blood Pressure Pulse Temperature Respirations Height Weight    129/91 71 98  F (36.7  C) 18 1.854 m (6' 1\") 81.6 kg (180 lb)    Pulse Oximetry BMI (Body Mass Index)                96% 23.75 kg/m2          MyChart Information     [employer] gives you secure access to your electronic health record. If you see a primary care provider, you can also send messages to your care team and make appointments. If you have questions, please call your primary care clinic.  If you do not have a primary care provider, please call [phone] and they will assist you.        Care EveryWhere ID     This is your Care EveryWhere ID. This could be used by other organizations to access your [place] medical records  [id number]        Equal Access to Services     [facility] GAYATHRI : Torsten Arenas, edilberto snider, kika west. So [facility] [phone].    ATENCIÓN: Si habla español, tiene a capps disposición " servicios gratuitos de asistencia lingüística. Emely cruz [phone].    We comply with applicable federal civil rights laws and [place] laws. We do not discriminate on the basis of race, color, national origin, age, disability, sex, sexual orientation, or gender identity.               Review of your medicines      UNREVIEWED medicines. Ask your doctor about these medicines        Dose / Directions    IBUPROFEN PO        Dose:  200 mg   Take 200 mg by mouth   Refills:  0         START taking        Dose / Directions    diazepam 5 MG tablet   Commonly known as:  VALIUM   Used for:  S/P lumbar microdiscectomy        Dose:  5 mg   Take 1 tablet (5 mg) by mouth every 6 hours as needed for muscle spasms or pain   Quantity:  30 tablet   Refills:  0       HYDROmorphone 2 MG tablet   Commonly known as:  DILAUDID   Used for:  S/P lumbar microdiscectomy        Dose:  2-4 mg   Take 1-2 tablets (2-4 mg) by mouth every 4 hours as needed for severe pain   Quantity:  60 tablet   Refills:  0         CONTINUE these medicines which have NOT CHANGED        Dose / Directions    Multi-vitamin Tabs tablet   Generic drug:  multivitamin, therapeutic with minerals        1 TABLET DAILY   Refills:  0       varenicline 0.5 MG X 11 & 1 MG X 42 tablet   Commonly known as:  CHANTIX STARTING MONTH PAK   Used for:  Smoking addiction        Take 0.5 mg tab daily for 3 days, then 0.5 mg tab twice daily for 4 days, then 1 mg twice daily.   Quantity:  53 tablet   Refills:  0       VITAMIN C PO        Dose:  1 tablet   Take 1 tablet by mouth.   Refills:  0       VITAMIN D PO        Dose:  1 tablet   Take 1 tablet by mouth.   Refills:  0         STOP taking     aspirin 81 MG tablet                Where to get your medicines      Some of these will need a paper prescription and others can be bought over the counter. Ask your nurse if you have questions.     Bring a paper prescription for each of these medications     diazepam 5 MG tablet     HYDROmorphone 2 MG tablet                Protect others around you: Learn how to safely use, store and throw away your medicines at www.disposemymeds.org.        Information about OPIOIDS     PRESCRIPTION OPIOIDS: WHAT YOU NEED TO KNOW   We gave you an opioid (narcotic) pain medicine. It is important to manage your pain, but opioids are not always the best choice. You should first try all the other options your care team gave you. Take this medicine for as short a time (and as few doses) as possible.    Some activities can increase your pain, such as bandage changes or therapy sessions. It may help to take your pain medicine 30 to 60 minutes before these activities. Reduce your stress by getting enough sleep, working on hobbies you enjoy and practicing relaxation or meditation. Talk to your care team about ways to manage your pain beyond prescription opioids.    These medicines have risks:    DO NOT drive when on new or higher doses of pain medicine. These medicines can affect your alertness and reaction times, and you could be arrested for driving under the influence (DUI). If you need to use opioids long-term, talk to your care team about driving.    DO NOT operate heavy machinery    DO NOT do any other dangerous activities while taking these medicines.    DO NOT drink any alcohol while taking these medicines.     If the opioid prescribed includes acetaminophen, DO NOT take with any other medicines that contain acetaminophen. Read all labels carefully. Look for the word  acetaminophen  or  Tylenol.  Ask your pharmacist if you have questions or are unsure.    You can get addicted to pain medicines, especially if you have a history of addiction (chemical, alcohol or substance dependence). Talk to your care team about ways to reduce this risk.    All opioids tend to cause constipation. Drink plenty of water and eat foods that have a lot of fiber, such as fruits, vegetables, prune juice, apple juice and high-fiber  cereal. Take a laxative (Miralax, milk of magnesia, Colace, Senna) if you don t move your bowels at least every other day. Other side effects include upset stomach, sleepiness, dizziness, throwing up, tolerance (needing more of the medicine to have the same effect), physical dependence and slowed breathing.    Store your pills in a secure place, locked if possible. We will not replace any lost or stolen medicine. If you don t finish your medicine, please throw away (dispose) as directed by your pharmacist. The [place] [employer] has more information about safe disposal: https://www.pca.[facility].us/living-green/managing-unwanted-medications             Medication List: This is a list of all your medications and when to take them. Check marks below indicate your daily home schedule. Keep this list as a reference.      Medications           Morning Afternoon Evening Bedtime As Needed    diazepam 5 MG tablet   Commonly known as:  VALIUM   Take 1 tablet (5 mg) by mouth every 6 hours as needed for muscle spasms or pain                                HYDROmorphone 2 MG tablet   Commonly known as:  DILAUDID   Take 1-2 tablets (2-4 mg) by mouth every 4 hours as needed for severe pain                                IBUPROFEN PO   Take 200 mg by mouth                                Multi-vitamin Tabs tablet   1 TABLET DAILY   Generic drug:  multivitamin, therapeutic with minerals                                varenicline 0.5 MG X 11 & 1 MG X 42 tablet   Commonly known as:  CHANTIX STARTING MONTH NEHEMIAS   Take 0.5 mg tab daily for 3 days, then 0.5 mg tab twice daily for 4 days, then 1 mg twice daily.                                VITAMIN C PO   Take 1 tablet by mouth.                                VITAMIN D PO   Take 1 tablet by mouth.

## 2018-11-19 NOTE — OP NOTE
Operative Report    PREOPERATIVE DIAGNOSIS: Large left L4-5 herniated disk with LLE > RLE radiculopathy     POSTOPERATIVE DIAGNOSIS: Same    PROCEDURE: Left L4-5 hemilaminectomy, medial facetectomy, foraminotomy with microdiscectomy and use of X-ray and intraoperative microscope     ASSISTANT: Arcenio Irizarry     INDICATION FOR PROCEDURE: The patient is a 53 year old male that presented with LLE > RLE radiculopathy. After reviewing the imaging studies and examination, the decision was made to proceed with the above procedure. The patient understood the risk of surgery to be infection, CSF leak, nerve root injury, failure of improvement of his symptoms. The patient voiced understanding and wanted to proceed.     DESCRIPTION OF PROCEDURE: The patient was seen in the pre op area and the procedure was discussed with the patient and significant other once again and all questions were answered. The consent was then signed and the lumbar spine was marked with a marker. The patient was transported to the operating room on a stretcher and received general endotracheal anesthesia. The patient was placed on the operating table in the prone position on the Gavino frame with all pressure points padded. The planned operative area of the back was prepped and draped in normal sterile fashion. Portable X-ray was used to identify the appropriate level. Local anesthesia was then injected along the planned incision. A 10 blade scalpel was used to make a midline incision with dissection down through the subcutaneous tissue to the fascia. The fascia was opened on the left side with the Bovie cautery. Subperiosteal dissection exposed the lamina facet joint at L4-5. The Cuellar retractor was then placed. The Midas Jaron drill and the Kerrison rongeur were used to perform the hemilaminectomy, medial facetectomy, foraminotomy, and to remove the ligamentum flavum. The thecal sac and L5 nerve root were identified. There was a very large free  fragment of disk compressing the left L5 root. The disk was then removed in two large fragments with the Alexandra curette and also the pituitary rongeur. The nerve root was then noted to be decompressed and the Tejeda ball hook was used to verify that. Copious irrigation was performed with saline. The retractor was removed and the wound was irrigated once again. The fascia was then closed with 0-Vicryl, the subcutaneous tissue with 3-0 Vicryl, and the skin closed with dermabond. The patient was then transported back to the stretcher, extubated, and sent to recovery. At the end of the case, all counts were correct.     No complications.     ESTIMATED BLOOD LOSS: 5 ml     IV FLUID: See Anesthesia report     The patient received Clindamycin preoperatively.     Armando Rico MD, MS, FAANS

## 2018-11-21 ENCOUNTER — DOCUMENTATION ONLY (OUTPATIENT)
Dept: NEUROSURGERY | Facility: CLINIC | Age: 53
End: 2018-11-21

## 2018-11-21 NOTE — PROGRESS NOTES
November 21, 2018    [employer] Forms: yes    Faxed to: [phone]     Type of form: Attending provider statement  Certification of health care provider for medical leave (FMLA)    Placed a copy in the bin and sent the original to medical records

## 2019-01-02 ENCOUNTER — OFFICE VISIT (OUTPATIENT)
Dept: NEUROSURGERY | Facility: CLINIC | Age: 54
End: 2019-01-02
Attending: NURSE PRACTITIONER
Payer: COMMERCIAL

## 2019-01-02 VITALS
SYSTOLIC BLOOD PRESSURE: 155 MMHG | OXYGEN SATURATION: 96 % | RESPIRATION RATE: 16 BRPM | DIASTOLIC BLOOD PRESSURE: 94 MMHG | HEART RATE: 74 BPM | TEMPERATURE: 98.3 F

## 2019-01-02 DIAGNOSIS — Z98.890 S/P LUMBAR MICRODISCECTOMY: Primary | ICD-10-CM

## 2019-01-02 PROCEDURE — 99024 POSTOP FOLLOW-UP VISIT: CPT | Performed by: NURSE PRACTITIONER

## 2019-01-02 PROCEDURE — G0463 HOSPITAL OUTPT CLINIC VISIT: HCPCS

## 2019-01-02 ASSESSMENT — PAIN SCALES - GENERAL: PAINLEVEL: NO PAIN (0)

## 2019-01-02 NOTE — NURSING NOTE
"Kip Winn is a 53 year old male who presents for:  Chief Complaint   Patient presents with     Surgical Followup        Initial Vitals:  There were no vitals taken for this visit. Estimated body mass index is 23.75 kg/m  as calculated from the following:    Height as of 11/19/18: 6' 1\" (1.854 m).    Weight as of 11/19/18: 180 lb (81.6 kg).. There is no height or weight on file to calculate BSA. BP completed using cuff size: regular  Data Unavailable    Do you feel safe in your environment?  Yes  Do you need any refills today? No    Nursing Comments:         Marium Li    "

## 2019-01-02 NOTE — PATIENT INSTRUCTIONS
-No lifting >30 pounds with ease into normal activities at 12 weeks post surgery.  - Call the clinic at [phone] for increased pain or any other questions and concerns.            -Patient to follow up with Primary Care provider regarding elevated blood pressure.

## 2019-01-02 NOTE — LETTER
"    1/2/2019         RE: Kip Winn  [address]  [place] [address]        Dear Colleague,    Thank you for referring your patient, Kip Winn, to the [facility]. Please see a copy of my visit note below.    [facility]  Neurosurgery followup:    HPI: Mr Winn is a 53 year old male with a history of lumbar radiculopathy and a large left L4-5 disc herniation.  He underwent a left L4-5 hemilaminectomy/microdiscectomy on 11/19/18 with Dr. Armando Rico.  He is here for his 6 week post op visit. He states he is doing well, minimal pain since surgery, \"I just notice tenderness to my left leg a little bit.\"  He denies weakness or falls.  He had RLE pain prior to surgery as well, much less, and states this has since resolved.  He has been walking regularly, increasing activities as able.     Exam:  Constitutional:  Alert, well nourished, NAD.  HEENT: Normocephalic, atraumatic.   Pulm:  Without shortness of breath   CV:  No pitting edema of BLE.      Neurological:  Awake  Alert  Oriented x 3  Motor exam:        IP Q DF PF EHL  R   5  5   5   5    5  L   5  5   5   5    5     Able to spontaneously move L/E bilaterally  Sensation intact throughout all L/E dermatomes     Incision: Clean, dry and intact    A/P:  Mr Winn is a 53 year old male with a history of lumbar radiculopathy and a large left L4-5 disc herniation.  He underwent a left L4-5 hemilaminectomy/microdiscectomy on 11/19/18 with Dr. Armando Rico.  He is here for his 6 week post op visit. He states he is doing well, minimal pain since surgery, \"I just notice tenderness to my left leg a little bit.\"  He denies weakness or falls.  He had RLE pain prior to surgery as well, much less, and states this has since resolved.  He has been walking regularly, increasing activities as able. We discussed normal expectations following surgery and easing into normal at 12 weeks.  He is agreeable and will contact " us if any questions.     Patient Instructions   -No lifting >30 pounds with ease into normal activities at 12 weeks post surgery.  - Call the clinic at [phone] for increased pain or any other questions and concerns.            -Patient to follow up with Primary Care provider regarding elevated blood pressure.        Yvonne Jimenez CNP  [facility]  [address] [address]    Tel [phone]  Pager [phone]        Again, thank you for allowing me to participate in the care of your patient.        Sincerely,        Yvonne Jimenez, NP

## 2019-01-02 NOTE — PROGRESS NOTES
"[facility]  Neurosurgery followup:    HPI: Mr Winn is a 53 year old male with a history of lumbar radiculopathy and a large left L4-5 disc herniation.  He underwent a left L4-5 hemilaminectomy/microdiscectomy on 11/19/18 with Dr. Armando Rico.  He is here for his 6 week post op visit. He states he is doing well, minimal pain since surgery, \"I just notice tenderness to my left leg a little bit.\"  He denies weakness or falls.  He had RLE pain prior to surgery as well, much less, and states this has since resolved.  He has been walking regularly, increasing activities as able.     Exam:  Constitutional:  Alert, well nourished, NAD.  HEENT: Normocephalic, atraumatic.   Pulm:  Without shortness of breath   CV:  No pitting edema of BLE.      Neurological:  Awake  Alert  Oriented x 3  Motor exam:        IP Q DF PF EHL  R   5  5   5   5    5  L   5  5   5   5    5     Able to spontaneously move L/E bilaterally  Sensation intact throughout all L/E dermatomes     Incision: Clean, dry and intact    A/P:  Mr Winn is a 53 year old male with a history of lumbar radiculopathy and a large left L4-5 disc herniation.  He underwent a left L4-5 hemilaminectomy/microdiscectomy on 11/19/18 with Dr. Armando Rico.  He is here for his 6 week post op visit. He states he is doing well, minimal pain since surgery, \"I just notice tenderness to my left leg a little bit.\"  He denies weakness or falls.  He had RLE pain prior to surgery as well, much less, and states this has since resolved.  He has been walking regularly, increasing activities as able. We discussed normal expectations following surgery and easing into normal at 12 weeks.  He is agreeable and will contact us if any questions.     Patient Instructions   -No lifting >30 pounds with ease into normal activities at 12 weeks post surgery.  - Call the clinic at [phone] for increased pain or any other questions and concerns.            -Patient to follow up " with Primary Care provider regarding elevated blood pressure.        Yvonne Jimenez CNP  [facility]  [address] [address]    Tel [phone]  Pager [phone]

## 2019-02-19 ENCOUNTER — OFFICE VISIT (OUTPATIENT)
Dept: FAMILY MEDICINE | Facility: CLINIC | Age: 54
End: 2019-02-19
Payer: COMMERCIAL

## 2019-02-19 VITALS
OXYGEN SATURATION: 99 % | TEMPERATURE: 97.6 F | BODY MASS INDEX: 26.51 KG/M2 | HEART RATE: 80 BPM | WEIGHT: 200 LBS | DIASTOLIC BLOOD PRESSURE: 80 MMHG | SYSTOLIC BLOOD PRESSURE: 126 MMHG | HEIGHT: 73 IN

## 2019-02-19 DIAGNOSIS — Z00.00 ENCOUNTER FOR ANNUAL PHYSICAL EXAM: ICD-10-CM

## 2019-02-19 DIAGNOSIS — Z13.6 CARDIOVASCULAR SCREENING; LDL GOAL LESS THAN 130: Primary | ICD-10-CM

## 2019-02-19 DIAGNOSIS — F17.200 SMOKER: ICD-10-CM

## 2019-02-19 DIAGNOSIS — M51.26 HERNIATED NUCLEUS PULPOSUS, L4-5 RIGHT: ICD-10-CM

## 2019-02-19 DIAGNOSIS — Z80.42 FAMILY HISTORY OF PROSTATE CANCER: ICD-10-CM

## 2019-02-19 LAB
ALBUMIN SERPL-MCNC: 4.2 G/DL (ref 3.4–5)
ALP SERPL-CCNC: 65 U/L (ref 40–150)
ALT SERPL W P-5'-P-CCNC: 24 U/L (ref 0–70)
ANION GAP SERPL CALCULATED.3IONS-SCNC: 4 MMOL/L (ref 3–14)
AST SERPL W P-5'-P-CCNC: 18 U/L (ref 0–45)
BILIRUB SERPL-MCNC: 0.5 MG/DL (ref 0.2–1.3)
BUN SERPL-MCNC: 13 MG/DL (ref 7–30)
CALCIUM SERPL-MCNC: 9.2 MG/DL (ref 8.5–10.1)
CHLORIDE SERPL-SCNC: 106 MMOL/L (ref 94–109)
CHOLEST SERPL-MCNC: 178 MG/DL
CO2 SERPL-SCNC: 30 MMOL/L (ref 20–32)
CREAT SERPL-MCNC: 0.85 MG/DL (ref 0.66–1.25)
GFR SERPL CREATININE-BSD FRML MDRD: >90 ML/MIN/{1.73_M2}
GLUCOSE SERPL-MCNC: 111 MG/DL (ref 70–99)
HDLC SERPL-MCNC: 46 MG/DL
LDLC SERPL CALC-MCNC: 79 MG/DL
NONHDLC SERPL-MCNC: 132 MG/DL
POTASSIUM SERPL-SCNC: 4.1 MMOL/L (ref 3.4–5.3)
PROT SERPL-MCNC: 7.7 G/DL (ref 6.8–8.8)
PSA SERPL-ACNC: 1.09 UG/L (ref 0–4)
SODIUM SERPL-SCNC: 140 MMOL/L (ref 133–144)
TRIGL SERPL-MCNC: 263 MG/DL

## 2019-02-19 PROCEDURE — 99396 PREV VISIT EST AGE 40-64: CPT | Performed by: FAMILY MEDICINE

## 2019-02-19 PROCEDURE — 80053 COMPREHEN METABOLIC PANEL: CPT | Performed by: FAMILY MEDICINE

## 2019-02-19 PROCEDURE — 80061 LIPID PANEL: CPT | Performed by: FAMILY MEDICINE

## 2019-02-19 PROCEDURE — G0103 PSA SCREENING: HCPCS | Performed by: FAMILY MEDICINE

## 2019-02-19 PROCEDURE — 36415 COLL VENOUS BLD VENIPUNCTURE: CPT | Performed by: FAMILY MEDICINE

## 2019-02-19 ASSESSMENT — MIFFLIN-ST. JEOR: SCORE: 1816.07

## 2019-02-19 NOTE — PROGRESS NOTES
SUBJECTIVE:   CC: Kip Winn is an 53 year old male who presents for preventative health visit.     Physical   Annual:     Getting at least 3 servings of Calcium per day:  Yes    Bi-annual eye exam:  Yes    Dental care twice a year:  Yes    Sleep apnea or symptoms of sleep apnea:  None    Diet:  Regular (no restrictions)    Frequency of exercise:  None    Taking medications regularly:  Yes    Medication side effects:  Not applicable    Additional concerns today:  No    PHQ-2 Total Score: 0          No issues, had back surgery for herniated disc.over all feel good after surgery.    Today's PHQ-2 Score:   PHQ-2 (  [employer]) 2019   Q1: Little interest or pleasure in doing things 0   Q2: Feeling down, depressed or hopeless 0   PHQ-2 Score 0   Q1: Little interest or pleasure in doing things Not at all   Q2: Feeling down, depressed or hopeless Not at all   PHQ-2 Score 0       Abuse: Current or Past(Physical, Sexual or Emotional)- No  Do you feel safe in your environment? Yes    Social History     Tobacco Use     Smoking status: Former Smoker     Packs/day: 1.00     Years: 20.00     Pack years: 20.00     Types: Cigarettes     Last attempt to quit: 2018     Years since quittin.2     Smokeless tobacco: Never Used   Substance Use Topics     Alcohol use: Yes     Alcohol/week: 0.0 oz     Comment: 2 drinks a month     Alcohol Use 2019   If you drink alcohol do you typically have greater than 3 drinks per day OR greater than 7 drinks per week? No       Last PSA:   PSA   Date Value Ref Range Status   2018 0.91 0 - 4 ug/L Final     Comment:     Assay Method:  Chemiluminescence using Siemens Vista analyzer       Reviewed orders with patient. Reviewed health maintenance and updated orders accordingly - Yes  Labs reviewed in EPIC  BP Readings from Last 3 Encounters:   19 126/80   19 (!) 155/94   18 (!) 142/95    Wt Readings from Last 3 Encounters:   19 90.7 kg (200 lb)    11/19/18 81.6 kg (180 lb)   11/01/18 82.1 kg (181 lb)                    Reviewed and updated as needed this visit by clinical staff         Reviewed and updated as needed this visit by Provider            Review of Systems  CONSTITUTIONAL: NEGATIVE for fever, chills, change in weight  INTEGUMENTARY/SKIN: NEGATIVE for worrisome rashes, moles or lesions  EYES: NEGATIVE for vision changes or irritation  ENT: NEGATIVE for ear, mouth and throat problems  RESP: NEGATIVE for significant cough or SOB  CV: NEGATIVE for chest pain, palpitations or peripheral edema  GI: NEGATIVE for nausea, abdominal pain, heartburn, or change in bowel habits   male: negative for dysuria, hematuria, decreased urinary stream, erectile dysfunction, urethral discharge  MUSCULOSKELETAL: NEGATIVE for significant arthralgias or myalgia  NEURO: NEGATIVE for weakness, dizziness or paresthesias  PSYCHIATRIC: NEGATIVE for changes in mood or affect    OBJECTIVE:   There were no vitals taken for this visit.    Physical Exam  GENERAL: healthy, alert and no distress  EYES: Eyes grossly normal to inspection, PERRL and conjunctivae and sclerae normal  HENT: ear canals and TM's normal, nose and mouth without ulcers or lesions  NECK: no adenopathy, no asymmetry, masses, or scars and thyroid normal to palpation  RESP: lungs clear to auscultation - no rales, rhonchi or wheezes  CV: regular rate and rhythm, normal S1 S2, no S3 or S4, no murmur, click or rub, no peripheral edema and peripheral pulses strong  ABDOMEN: soft, nontender, no hepatosplenomegaly, no masses and bowel sounds normal  MS: no gross musculoskeletal defects noted, no edema  SKIN: no suspicious lesions or rashes  NEURO: Normal strength and tone, mentation intact and speech normal  PSYCH: mentation appears normal, affect normal/bright    Diagnostic Test Results:  none     ASSESSMENT/PLAN:   1. CARDIOVASCULAR SCREENING; LDL GOAL LESS THAN 130    - Comprehensive metabolic panel  - Lipid panel  "reflex to direct LDL Fasting    2. Encounter for annual physical exam    - Comprehensive metabolic panel  - Lipid panel reflex to direct LDL Fasting    3. Herniated nucleus pulposus, L4-5 right      4. Smoker      5. Family history of prostate cancer    - PSA, screen    COUNSELING:   Reviewed preventive health counseling, as reflected in patient instructions       Regular exercise       Healthy diet/nutrition    BP Readings from Last 1 Encounters:   01/02/19 (!) 155/94     Estimated body mass index is 23.75 kg/m  as calculated from the following:    Height as of 11/19/18: 1.854 m (6' 1\").    Weight as of 11/19/18: 81.6 kg (180 lb).           reports that he quit smoking about 3 months ago. His smoking use included cigarettes. He has a 20.00 pack-year smoking history. he has never used smokeless tobacco.      Counseling Resources:  ATP IV Guidelines  Pooled Cohorts Equation Calculator  FRAX Risk Assessment  ICSI Preventive Guidelines  Dietary Guidelines for Americans, 2010  USDA's MyPlate  ASA Prophylaxis  Lung CA Screening    Dany Richardson MD  [facility] [place]  "

## 2019-11-08 ENCOUNTER — HEALTH MAINTENANCE LETTER (OUTPATIENT)
Age: 54
End: 2019-11-08

## 2019-12-02 NOTE — INTERVAL H&P NOTE
This note is for the purpose of making the H&P performed in clinic within the last 30 days available in the hospital surgical encounter.   
69

## 2020-02-20 ENCOUNTER — OFFICE VISIT (OUTPATIENT)
Dept: FAMILY MEDICINE | Facility: CLINIC | Age: 55
End: 2020-02-20
Payer: COMMERCIAL

## 2020-02-20 VITALS
SYSTOLIC BLOOD PRESSURE: 136 MMHG | BODY MASS INDEX: 26.37 KG/M2 | HEART RATE: 82 BPM | DIASTOLIC BLOOD PRESSURE: 70 MMHG | TEMPERATURE: 96.7 F | WEIGHT: 199 LBS | HEIGHT: 73 IN

## 2020-02-20 DIAGNOSIS — R73.9 ELEVATED BLOOD SUGAR: ICD-10-CM

## 2020-02-20 DIAGNOSIS — Z00.00 ROUTINE GENERAL MEDICAL EXAMINATION AT A HEALTH CARE FACILITY: Primary | ICD-10-CM

## 2020-02-20 DIAGNOSIS — Z23 NEED FOR VACCINATION: ICD-10-CM

## 2020-02-20 DIAGNOSIS — Z80.42 FAMILY HISTORY OF PROSTATE CANCER: ICD-10-CM

## 2020-02-20 DIAGNOSIS — Z12.5 SCREENING FOR PROSTATE CANCER: ICD-10-CM

## 2020-02-20 DIAGNOSIS — F17.200 SMOKING ADDICTION: ICD-10-CM

## 2020-02-20 DIAGNOSIS — Z13.6 CARDIOVASCULAR SCREENING; LDL GOAL LESS THAN 130: ICD-10-CM

## 2020-02-20 LAB
ALBUMIN SERPL-MCNC: 4.2 G/DL (ref 3.4–5)
ALP SERPL-CCNC: 62 U/L (ref 40–150)
ALT SERPL W P-5'-P-CCNC: 23 U/L (ref 0–70)
ANION GAP SERPL CALCULATED.3IONS-SCNC: 5 MMOL/L (ref 3–14)
AST SERPL W P-5'-P-CCNC: 17 U/L (ref 0–45)
BILIRUB SERPL-MCNC: 0.6 MG/DL (ref 0.2–1.3)
BUN SERPL-MCNC: 19 MG/DL (ref 7–30)
CALCIUM SERPL-MCNC: 8.9 MG/DL (ref 8.5–10.1)
CHLORIDE SERPL-SCNC: 107 MMOL/L (ref 94–109)
CHOLEST SERPL-MCNC: 193 MG/DL
CO2 SERPL-SCNC: 28 MMOL/L (ref 20–32)
CREAT SERPL-MCNC: 0.82 MG/DL (ref 0.66–1.25)
GFR SERPL CREATININE-BSD FRML MDRD: >90 ML/MIN/{1.73_M2}
GLUCOSE SERPL-MCNC: 121 MG/DL (ref 70–99)
HBA1C MFR BLD: 5.5 % (ref 0–5.6)
HDLC SERPL-MCNC: 45 MG/DL
LDLC SERPL CALC-MCNC: 107 MG/DL
NONHDLC SERPL-MCNC: 148 MG/DL
POTASSIUM SERPL-SCNC: 4.1 MMOL/L (ref 3.4–5.3)
PROT SERPL-MCNC: 7.6 G/DL (ref 6.8–8.8)
PSA SERPL-ACNC: 0.9 UG/L (ref 0–4)
SODIUM SERPL-SCNC: 140 MMOL/L (ref 133–144)
TRIGL SERPL-MCNC: 206 MG/DL

## 2020-02-20 PROCEDURE — 99396 PREV VISIT EST AGE 40-64: CPT | Mod: 25 | Performed by: FAMILY MEDICINE

## 2020-02-20 PROCEDURE — 83036 HEMOGLOBIN GLYCOSYLATED A1C: CPT | Performed by: FAMILY MEDICINE

## 2020-02-20 PROCEDURE — 90471 IMMUNIZATION ADMIN: CPT | Performed by: FAMILY MEDICINE

## 2020-02-20 PROCEDURE — 80053 COMPREHEN METABOLIC PANEL: CPT | Performed by: FAMILY MEDICINE

## 2020-02-20 PROCEDURE — 80061 LIPID PANEL: CPT | Performed by: FAMILY MEDICINE

## 2020-02-20 PROCEDURE — G0103 PSA SCREENING: HCPCS | Performed by: FAMILY MEDICINE

## 2020-02-20 PROCEDURE — 90750 HZV VACC RECOMBINANT IM: CPT | Performed by: FAMILY MEDICINE

## 2020-02-20 PROCEDURE — 36415 COLL VENOUS BLD VENIPUNCTURE: CPT | Performed by: FAMILY MEDICINE

## 2020-02-20 ASSESSMENT — MIFFLIN-ST. JEOR: SCORE: 1806.54

## 2020-02-20 NOTE — PROGRESS NOTES
3  SUBJECTIVE:   CC: Kip Winn is an 54 year old male who presents for preventive health visit.     Healthy Habits:  Answers for HPI/ROS submitted by the patient on 2020   Annual Exam:  Frequency of exercise:: 1 day/week  Getting at least 3 servings of Calcium per day:: Yes  Diet:: Regular (no restrictions)  Taking medications regularly:: Yes  Medication side effects:: Not applicable  Bi-annual eye exam:: Yes  Dental care twice a year:: Yes  Sleep apnea or symptoms of sleep apnea:: None  Additional concerns today:: Yes  Duration of exercise:: 15-30 minutes    PROBLEMS TO ADD ON... over all feels good  Had back surgery last year which turned out to be very good results.  Triglycerides are mildly elevated along with blood sugar which is in the prediabetic range.  Denies any chest pains no shortness of breath.  Currently not on any prescription medication  Patient started smoking almost 6 months ago, requesting Chantix as he has a good results in the past and he would like to quit.    Today's PHQ-2 Score:   PHQ-2 (  [employer]) 2020   Q1: Little interest or pleasure in doing things 0 0   Q2: Feeling down, depressed or hopeless 0 0   PHQ-2 Score 0 0   Q1: Little interest or pleasure in doing things Not at all Not at all   Q2: Feeling down, depressed or hopeless Not at all Not at all   PHQ-2 Score 0 0       Abuse: Current or Past(Physical, Sexual or Emotional)- NO  Do you feel safe in your environment? YES        Social History     Tobacco Use     Smoking status: Former Smoker     Packs/day: 1.00     Years: 20.00     Pack years: 20.00     Types: Cigarettes     Last attempt to quit: 2018     Years since quittin.2     Smokeless tobacco: Never Used   Substance Use Topics     Alcohol use: Yes     Alcohol/week: 0.0 standard drinks     Comment: 2 drinks a month     If you drink alcohol do you typically have >3 drinks per day or >7 drinks per week? No                      Last PSA:   PSA    Date Value Ref Range Status   02/19/2019 1.09 0 - 4 ug/L Final     Comment:     Assay Method:  Chemiluminescence using Siemens Vista analyzer       Reviewed orders with patient. Reviewed health maintenance and updated orders accordingly - Yes      Reviewed and updated as needed this visit by clinical staff         Reviewed and updated as needed this visit by Provider      ROS:  CONSTITUTIONAL: NEGATIVE for fever, chills, change in weight  INTEGUMENTARY/SKIN: NEGATIVE for worrisome rashes, moles or lesions  EYES: NEGATIVE for vision changes or irritation  ENT: NEGATIVE for ear, mouth and throat problems  RESP: NEGATIVE for significant cough or SOB  CV: NEGATIVE for chest pain, palpitations or peripheral edema  GI: NEGATIVE for nausea, abdominal pain, heartburn, or change in bowel habits   male: negative for dysuria, hematuria, decreased urinary stream, erectile dysfunction, urethral discharge  MUSCULOSKELETAL: NEGATIVE for significant arthralgias or myalgia  NEURO: NEGATIVE for weakness, dizziness or paresthesias  PSYCHIATRIC: NEGATIVE for changes in mood or affect    OBJECTIVE:   There were no vitals taken for this visit.  EXAM:  GENERAL: healthy, alert and no distress  EYES: Eyes grossly normal to inspection, PERRL and conjunctivae and sclerae normal  HENT: ear canals and TM's normal, nose and mouth without ulcers or lesions  NECK: no adenopathy, no asymmetry, masses, or scars and thyroid normal to palpation  RESP: lungs clear to auscultation - no rales, rhonchi or wheezes  CV: regular rate and rhythm, normal S1 S2, no S3 or S4, no murmur, click or rub, no peripheral edema and peripheral pulses strong  ABDOMEN: soft, nontender, no hepatosplenomegaly, no masses and bowel sounds normal  MS: no gross musculoskeletal defects noted, no edema  SKIN: no suspicious lesions or rashes  NEURO: Normal strength and tone, mentation intact and speech normal  PSYCH: mentation appears normal, affect normal/bright    Diagnostic  "Test Results:  Labs reviewed in Epic    ASSESSMENT/PLAN:   1. Routine general medical examination at a health care facility    - Comprehensive metabolic panel  - Lipid panel reflex to direct LDL Fasting  - Prostate spec antigen screen    2. CARDIOVASCULAR SCREENING; LDL GOAL LESS THAN 130    - Comprehensive metabolic panel  - Lipid panel reflex to direct LDL Fasting    3. Family history of prostate cancer    - Prostate spec antigen screen    4. Screening for prostate cancer    - Prostate spec antigen screen    5. Smoking addiction    - varenicline 0.5 MG X 11 & 1 MG X 42 PO tablet; Take 0.5 mg tab daily for 3 days, THEN 0.5 mg tab twice daily for 4 days, THEN 1 mg twice daily.  Dispense: 53 tablet; Refill: 0    6. Elevated blood sugar    - Hemoglobin A1c    COUNSELING:  Reviewed preventive health counseling, as reflected in patient instructions       Regular exercise       Healthy diet/nutrition    Estimated body mass index is 26.39 kg/m  as calculated from the following:    Height as of 2/19/19: 1.854 m (6' 1\").    Weight as of 2/19/19: 90.7 kg (200 lb).         reports that he quit smoking about 15 months ago. His smoking use included cigarettes. He has a 20.00 pack-year smoking history. He has never used smokeless tobacco.  Tobacco Cessation Action Plan: Pharmacotherapies : Chantix    Counseling Resources:  ATP IV Guidelines  Pooled Cohorts Equation Calculator  FRAX Risk Assessment  ICSI Preventive Guidelines  Dietary Guidelines for Americans, 2010  USDA's MyPlate  ASA Prophylaxis  Lung CA Screening    Dany Richardson MD  [facility]  "

## 2020-02-20 NOTE — LETTER
February 21, 2020      Kip Walden Pa  [address]  [place] [address]        Dear ,    We are writing to inform you of your test results.    -PSA (prostate specific antigen) test is normal.  This indicates a low likelihood of prostate cancer.  ADVISE: rechecking this in 1 year.   -Cholesterol level indicate mildly elevated LDL but overall stable.  No new medications indicated   -Kidney functions are normal   -Liver functions are normal   -Blood glucose came back slight elevated, however your three-month blood sugar is within normal range, no diabetes.  I would suggest to work on your diet and exercise more frequently, make healthy choices for food.  We will continue to monitor.  At this time no new medications.     Resulted Orders   Comprehensive metabolic panel   Result Value Ref Range    Sodium 140 133 - 144 mmol/L    Potassium 4.1 3.4 - 5.3 mmol/L    Chloride 107 94 - 109 mmol/L    Carbon Dioxide 28 20 - 32 mmol/L    Anion Gap 5 3 - 14 mmol/L    Glucose 121 (H) 70 - 99 mg/dL      Comment:      Fasting specimen    Urea Nitrogen 19 7 - 30 mg/dL    Creatinine 0.82 0.66 - 1.25 mg/dL    GFR Estimate >90 >60 mL/min/[1.73_m2]      Comment:      Non  GFR Calc  Starting 12/18/2018, serum creatinine based estimated GFR (eGFR) will be   calculated using the Chronic Kidney Disease Epidemiology Collaboration   (CKD-EPI) equation.      GFR Estimate If Black >90 >60 mL/min/[1.73_m2]      Comment:       GFR Calc  Starting 12/18/2018, serum creatinine based estimated GFR (eGFR) will be   calculated using the Chronic Kidney Disease Epidemiology Collaboration   (CKD-EPI) equation.      Calcium 8.9 8.5 - 10.1 mg/dL    Bilirubin Total 0.6 0.2 - 1.3 mg/dL    Albumin 4.2 3.4 - 5.0 g/dL    Protein Total 7.6 6.8 - 8.8 g/dL    Alkaline Phosphatase 62 40 - 150 U/L    ALT 23 0 - 70 U/L    AST 17 0 - 45 U/L   Lipid panel reflex to direct LDL Fasting   Result Value Ref Range    Cholesterol  193 <200 mg/dL    Triglycerides 206 (H) <150 mg/dL      Comment:      Borderline high:  150-199 mg/dl  High:             200-499 mg/dl  Very high:       >499 mg/dl  Fasting specimen      HDL Cholesterol 45 >39 mg/dL    LDL Cholesterol Calculated 107 (H) <100 mg/dL      Comment:      Above desirable:  100-129 mg/dl  Borderline High:  130-159 mg/dL  High:             160-189 mg/dL  Very high:       >189 mg/dl      Non HDL Cholesterol 148 (H) <130 mg/dL      Comment:      Above Desirable:  130-159 mg/dl  Borderline high:  160-189 mg/dl  High:             190-219 mg/dl  Very high:       >219 mg/dl     Prostate spec antigen screen   Result Value Ref Range    PSA 0.90 0 - 4 ug/L      Comment:      Assay Method:  Chemiluminescence using Siemens Vista analyzer   Hemoglobin A1c   Result Value Ref Range    Hemoglobin A1C 5.5 0 - 5.6 %      Comment:      Normal <5.7% Prediabetes 5.7-6.4%  Diabetes 6.5% or higher - adopted from ADA   consensus guidelines.         If you have any questions or concerns, please call the clinic at the number listed above.       Sincerely,        Dany Richardson MD

## 2020-12-06 ENCOUNTER — HEALTH MAINTENANCE LETTER (OUTPATIENT)
Age: 55
End: 2020-12-06

## 2020-12-09 ENCOUNTER — VIRTUAL VISIT (OUTPATIENT)
Dept: FAMILY MEDICINE | Facility: CLINIC | Age: 55
End: 2020-12-09
Payer: COMMERCIAL

## 2020-12-09 DIAGNOSIS — Z20.822 COVID-19 RULED OUT: Primary | ICD-10-CM

## 2020-12-09 DIAGNOSIS — L81.9 DISCOLORATION OF SKIN OF TOE: ICD-10-CM

## 2020-12-09 DIAGNOSIS — Z20.822 COVID-19 RULED OUT: ICD-10-CM

## 2020-12-09 PROCEDURE — 99214 OFFICE O/P EST MOD 30 MIN: CPT | Mod: 95 | Performed by: FAMILY MEDICINE

## 2020-12-09 PROCEDURE — U0003 INFECTIOUS AGENT DETECTION BY NUCLEIC ACID (DNA OR RNA); SEVERE ACUTE RESPIRATORY SYNDROME CORONAVIRUS 2 (SARS-COV-2) (CORONAVIRUS DISEASE [COVID-19]), AMPLIFIED PROBE TECHNIQUE, MAKING USE OF HIGH THROUGHPUT TECHNOLOGIES AS DESCRIBED BY CMS-2020-01-R: HCPCS | Performed by: FAMILY MEDICINE

## 2020-12-09 NOTE — PROGRESS NOTES
"Kip Winn is a 55 year old male who is being evaluated via a billable video visit.      The patient has been notified of following:     \"This video visit will be conducted via a call between you and your physician/provider. We have found that certain health care needs can be provided without the need for an in-person physical exam.  This service lets us provide the care you need with a video conversation.  If a prescription is necessary we can send it directly to your pharmacy.  If lab work is needed we can place an order for that and you can then stop by our lab to have the test done at a later time.    Video visits are billed at different rates depending on your insurance coverage.  Please reach out to your insurance provider with any questions.    If during the course of the call the physician/provider feels a video visit is not appropriate, you will not be charged for this service.\"    Patient has given verbal consent for Video visit? Yes  How would you like to obtain your AVS? MyChart  If you are dropped from the video visit, the video invite should be resent to: Text to cell phone: [phone]  Will anyone else be joining your video visit? No      Subjective     Kip Winn is a 55 year old male who presents today via video visit for the following health issues:    HPI       Concern for COVID-19  About how many days ago did these symptoms start? 11/29/2020  Is this your first visit for this illness? Yes  In the 14 days before your symptoms started, have you had close contact with someone with COVID-19 (Coronavirus)? No  Do you have a fever or chills? No  Are you having new or worsening difficulty breathing? No  Do you have new or worsening cough? No  Have you had any new or unexplained body aches? YES    Have you experienced any of the following NEW symptoms?    Headache: YES    Sore throat: No    Loss of taste or smell: No    Chest pain: No    Diarrhea: No    Rash: YES, toes on both feet are " purple  What treatments have you tried? Hydrocortisone and has been trying to keeping them warm with socks and slippers   Who do you live with?    Are you, or a household member, a healthcare worker or a ? No  Do you live in a nursing home, group home, or shelter? No  Do you have a way to get food/medications if quarantined? Yes, I have a friend or family member who can help me.              Video Start Time: 12:00        Review of Systems   Constitutional, HEENT, cardiovascular, pulmonary, gi and gu systems are negative, except as otherwise noted.      Objective           Vitals:  No vitals were obtained today due to virtual visit.    Physical Exam     GENERAL: Healthy, alert and no distress  EYES: Eyes grossly normal to inspection.  No discharge or erythema, or obvious scleral/conjunctival abnormalities.  RESP: No audible wheeze, cough, or visible cyanosis.  No visible retractions or increased work of breathing.    SKIN: Visible skin clear. No significant rash, abnormal pigmentation or lesions.  NEURO: Cranial nerves grossly intact.  Mentation and speech appropriate for age.  PSYCH: Mentation appears normal, affect normal/bright, judgement and insight intact, normal speech and appearance well-groomed.  Bilateral foot toes are slightly discolored erythema and some swelling.  Able to move toes not warm to touch              Assessment & Plan     COVID-19 ruled out    - Symptomatic COVID-19 Virus (Coronavirus) by PCR; Future    Discoloration of skin of toe  Exact etiology is not clear however there is some phenomenon called Covid toes could be a possibility.  We will first rule out any Covid etiology.  If Covid is positive we will have him follow-up if symptoms are worsening if negative will have him see a skin care for further evaluation.  Warning signs were discussed with the patient.  Any worsening any change in color or more numbness or tingling or cold feet he needs to report back  "immediately.  - Symptomatic COVID-19 Virus (Coronavirus) by PCR; Future     BMI:   Estimated body mass index is 26.25 kg/m  as calculated from the following:    Height as of 2/20/20: 1.854 m (6' 1\").    Weight as of 2/20/20: 90.3 kg (199 lb).              Dany Richardson MD  [facility]      Video-Visit Details    Type of service:  Video Visit    Video End Time:12:10    Originating Location (pt. Location): Home    Distant Location (provider location):  [facility]     Platform used for Video Visit: Blue        "

## 2020-12-10 LAB
SARS-COV-2 RNA SPEC QL NAA+PROBE: NOT DETECTED
SPECIMEN SOURCE: NORMAL

## 2021-02-25 ENCOUNTER — OFFICE VISIT (OUTPATIENT)
Dept: FAMILY MEDICINE | Facility: CLINIC | Age: 56
End: 2021-02-25
Payer: COMMERCIAL

## 2021-02-25 VITALS
DIASTOLIC BLOOD PRESSURE: 82 MMHG | TEMPERATURE: 97.2 F | WEIGHT: 205 LBS | BODY MASS INDEX: 26.31 KG/M2 | HEIGHT: 74 IN | HEART RATE: 97 BPM | SYSTOLIC BLOOD PRESSURE: 136 MMHG | OXYGEN SATURATION: 97 %

## 2021-02-25 DIAGNOSIS — Z13.6 CARDIOVASCULAR SCREENING; LDL GOAL LESS THAN 130: ICD-10-CM

## 2021-02-25 DIAGNOSIS — Z00.00 ROUTINE GENERAL MEDICAL EXAMINATION AT A HEALTH CARE FACILITY: Primary | ICD-10-CM

## 2021-02-25 DIAGNOSIS — Z12.5 SCREENING FOR PROSTATE CANCER: ICD-10-CM

## 2021-02-25 DIAGNOSIS — Z80.42 FAMILY HISTORY OF PROSTATE CANCER: ICD-10-CM

## 2021-02-25 DIAGNOSIS — R73.9 ELEVATED BLOOD SUGAR: ICD-10-CM

## 2021-02-25 LAB
ALBUMIN SERPL-MCNC: 4.1 G/DL (ref 3.4–5)
ALP SERPL-CCNC: 67 U/L (ref 40–150)
ALT SERPL W P-5'-P-CCNC: 27 U/L (ref 0–70)
ANION GAP SERPL CALCULATED.3IONS-SCNC: 4 MMOL/L (ref 3–14)
AST SERPL W P-5'-P-CCNC: 16 U/L (ref 0–45)
BILIRUB SERPL-MCNC: 0.7 MG/DL (ref 0.2–1.3)
BUN SERPL-MCNC: 15 MG/DL (ref 7–30)
CALCIUM SERPL-MCNC: 9.5 MG/DL (ref 8.5–10.1)
CHLORIDE SERPL-SCNC: 108 MMOL/L (ref 94–109)
CHOLEST SERPL-MCNC: 177 MG/DL
CO2 SERPL-SCNC: 28 MMOL/L (ref 20–32)
CREAT SERPL-MCNC: 0.86 MG/DL (ref 0.66–1.25)
GFR SERPL CREATININE-BSD FRML MDRD: >90 ML/MIN/{1.73_M2}
GLUCOSE SERPL-MCNC: 110 MG/DL (ref 70–99)
HBA1C MFR BLD: 5.6 % (ref 0–5.6)
HDLC SERPL-MCNC: 42 MG/DL
LDLC SERPL CALC-MCNC: 69 MG/DL
NONHDLC SERPL-MCNC: 135 MG/DL
POTASSIUM SERPL-SCNC: 4 MMOL/L (ref 3.4–5.3)
PROT SERPL-MCNC: 7.5 G/DL (ref 6.8–8.8)
PSA SERPL-ACNC: 0.88 UG/L (ref 0–4)
SODIUM SERPL-SCNC: 140 MMOL/L (ref 133–144)
TRIGL SERPL-MCNC: 330 MG/DL

## 2021-02-25 PROCEDURE — 99396 PREV VISIT EST AGE 40-64: CPT | Performed by: FAMILY MEDICINE

## 2021-02-25 PROCEDURE — 83036 HEMOGLOBIN GLYCOSYLATED A1C: CPT | Performed by: FAMILY MEDICINE

## 2021-02-25 PROCEDURE — 80053 COMPREHEN METABOLIC PANEL: CPT | Performed by: FAMILY MEDICINE

## 2021-02-25 PROCEDURE — 80061 LIPID PANEL: CPT | Performed by: FAMILY MEDICINE

## 2021-02-25 PROCEDURE — 36415 COLL VENOUS BLD VENIPUNCTURE: CPT | Performed by: FAMILY MEDICINE

## 2021-02-25 PROCEDURE — G0103 PSA SCREENING: HCPCS | Performed by: FAMILY MEDICINE

## 2021-02-25 RX ORDER — ASPIRIN 81 MG/1
81 TABLET ORAL DAILY
COMMUNITY

## 2021-02-25 ASSESSMENT — MIFFLIN-ST. JEOR: SCORE: 1851.11

## 2021-02-25 NOTE — PROGRESS NOTES
SUBJECTIVE:   CC: Kip Winn is an 55 year old male who presents for preventative health visit.       Patient has been advised of split billing requirements and indicates understanding: Yes  Healthy Habits:     Getting at least 3 servings of Calcium per day:  Yes    Bi-annual eye exam:  Yes    Dental care twice a year:  Yes    Sleep apnea or symptoms of sleep apnea:  None    Diet:  Regular (no restrictions)    Frequency of exercise:  2-3 days/week    Duration of exercise:  Less than 15 minutes    Taking medications regularly:  Yes    Medication side effects:  None    PHQ-2 Total Score: 0    Additional concerns today:  Yes    Over all healthy,  Slight high fasting BS in the past. [place] have family history of prostate cancer.          Today's PHQ-2 Score:   PHQ-2 (  [employer]) 2021   Q1: Little interest or pleasure in doing things 0   Q2: Feeling down, depressed or hopeless 0   PHQ-2 Score 0   Q1: Little interest or pleasure in doing things Not at all   Q2: Feeling down, depressed or hopeless Not at all   PHQ-2 Score 0       Abuse: Current or Past(Physical, Sexual or Emotional)- No  Do you feel safe in your environment? Yes    Have you ever done Advance Care Planning? (For example, a Health Directive, POLST, or a discussion with a medical provider or your loved ones about your wishes): Yes, advance care planning is on file.    Social History     Tobacco Use     Smoking status: Current Every Day Smoker     Packs/day: 0.50     Years: 20.00     Pack years: 10.00     Types: Cigarettes     Last attempt to quit: 2018     Years since quittin.3     Smokeless tobacco: Never Used   Substance Use Topics     Alcohol use: Yes     Alcohol/week: 0.0 standard drinks     Comment: 2 drinks a month         Alcohol Use 2021   Prescreen: >3 drinks/day or >7 drinks/week? No   Prescreen: >3 drinks/day or >7 drinks/week? -       Last PSA:   PSA   Date Value Ref Range Status   2020 0.90 0 - 4 ug/L Final     Comment:      Assay Method:  Chemiluminescence using Siemens Vista analyzer       Reviewed orders with patient. Reviewed health maintenance and updated orders accordingly - Yes  Lab work is in process    Reviewed and updated as needed this visit by clinical staff                 Reviewed and updated as needed this visit by Provider                    Review of Systems  CONSTITUTIONAL: NEGATIVE for fever, chills, change in weight  INTEGUMENTARY/SKIN: NEGATIVE for worrisome rashes, moles or lesions  EYES: NEGATIVE for vision changes or irritation  ENT: NEGATIVE for ear, mouth and throat problems  RESP: NEGATIVE for significant cough or SOB  CV: NEGATIVE for chest pain, palpitations or peripheral edema  GI: NEGATIVE for nausea, abdominal pain, heartburn, or change in bowel habits   male: negative for dysuria, hematuria, decreased urinary stream, erectile dysfunction, urethral discharge  MUSCULOSKELETAL: NEGATIVE for significant arthralgias or myalgia  NEURO: NEGATIVE for weakness, dizziness or paresthesias  PSYCHIATRIC: NEGATIVE for changes in mood or affect    OBJECTIVE:   There were no vitals taken for this visit.    Physical Exam  GENERAL: healthy, alert and no distress  EYES: Eyes grossly normal to inspection, PERRL and conjunctivae and sclerae normal  HENT: ear canals and TM's normal, nose and mouth without ulcers or lesions  NECK: no adenopathy, no asymmetry, masses, or scars and thyroid normal to palpation  RESP: lungs clear to auscultation - no rales, rhonchi or wheezes  CV: regular rate and rhythm, normal S1 S2, no S3 or S4, no murmur, click or rub, no peripheral edema and peripheral pulses strong  ABDOMEN: soft, nontender, no hepatosplenomegaly, no masses and bowel sounds normal  MS: no gross musculoskeletal defects noted, no edema  SKIN: no suspicious lesions or rashes  NEURO: Normal strength and tone, mentation intact and speech normal  PSYCH: mentation appears normal, affect normal/bright    Diagnostic Test  "Results:  Labs reviewed in Epic    ASSESSMENT/PLAN:   1. Routine general medical examination at a health care facility    - Comprehensive metabolic panel  - Lipid panel reflex to direct LDL Fasting    2. CARDIOVASCULAR SCREENING; LDL GOAL LESS THAN 130    - Comprehensive metabolic panel  - Lipid panel reflex to direct LDL Fasting    3. Family history of prostate cancer    - Prostate spec antigen screen    4. Screening for prostate cancer    - Prostate spec antigen screen    5. Elevated blood sugar  Fasting BS has been slight elevated, will check AIC and follow up on that.  - Hemoglobin A1c    Patient has been advised of split billing requirements and indicates understanding: Yes  COUNSELING:   Reviewed preventive health counseling, as reflected in patient instructions       Regular exercise       Healthy diet/nutrition    Estimated body mass index is 26.25 kg/m  as calculated from the following:    Height as of 2/20/20: 1.854 m (6' 1\").    Weight as of 2/20/20: 90.3 kg (199 lb).         He reports that he has been smoking cigarettes. He has a 10.00 pack-year smoking history. He has never used smokeless tobacco.  Tobacco Cessation Action Plan:         Counseling Resources:  ATP IV Guidelines  Pooled Cohorts Equation Calculator  FRAX Risk Assessment  ICSI Preventive Guidelines  Dietary Guidelines for Americans, 2010  [employer]'s MyPlate  ASA Prophylaxis  Lung CA Screening    Dany Richardson MD  [facility]  "

## 2021-07-20 ENCOUNTER — E-VISIT (OUTPATIENT)
Dept: FAMILY MEDICINE | Facility: CLINIC | Age: 56
End: 2021-07-20
Payer: COMMERCIAL

## 2021-07-20 DIAGNOSIS — Z20.822 SUSPECTED COVID-19 VIRUS INFECTION: ICD-10-CM

## 2021-07-20 DIAGNOSIS — Z20.822 COVID-19 RULED OUT: Primary | ICD-10-CM

## 2021-07-20 DIAGNOSIS — Z20.822 COVID-19 RULED OUT: ICD-10-CM

## 2021-07-20 LAB
DEPRECATED S PYO AG THROAT QL EIA: NEGATIVE
GROUP A STREP BY PCR: NOT DETECTED
SARS-COV-2 RNA RESP QL NAA+PROBE: NEGATIVE

## 2021-07-20 PROCEDURE — 99421 OL DIG E/M SVC 5-10 MIN: CPT | Performed by: FAMILY MEDICINE

## 2021-07-20 PROCEDURE — 87651 STREP A DNA AMP PROBE: CPT | Performed by: FAMILY MEDICINE

## 2021-07-20 PROCEDURE — U0005 INFEC AGEN DETEC AMPLI PROBE: HCPCS | Performed by: FAMILY MEDICINE

## 2021-07-20 PROCEDURE — U0003 INFECTIOUS AGENT DETECTION BY NUCLEIC ACID (DNA OR RNA); SEVERE ACUTE RESPIRATORY SYNDROME CORONAVIRUS 2 (SARS-COV-2) (CORONAVIRUS DISEASE [COVID-19]), AMPLIFIED PROBE TECHNIQUE, MAKING USE OF HIGH THROUGHPUT TECHNOLOGIES AS DESCRIBED BY CMS-2020-01-R: HCPCS | Performed by: FAMILY MEDICINE

## 2021-07-20 NOTE — PATIENT INSTRUCTIONS
Dear Kip Winn,    Your symptoms show that you may have coronavirus (COVID-19). This illness can cause fever, cough and trouble breathing. Many people get a mild case and get better on their own. Some people can get very sick.    Will I be tested for COVID-19?  We would like to test you for Covid-19 virus. I have placed orders for this test.     To schedule: go to your [employer] home page and scroll down to the section that says  You have an appointment that needs to be scheduled  and click the large green button that says  Schedule Now  and follow the steps to find the next available openings.    If you are unable to complete these [employer] scheduling steps, please call [phone] to schedule your testing.     Return to work/school/ guidance:  Please let your workplace manager and staffing office know when your quarantine ends     We can t give you an exact date as it depends on the above. You can calculate this on your own or work with your manager/staffing office to calculate this. (For example if you were exposed on 10/4, you would have to quarantine for 14 full days. That would be through 10/18. You could return on 10/19.)      If you receive a positive COVID-19 test result, follow the guidance of the those who are giving you the results. Usually the return to work is 10 (or in some cases 20 days from symptom onset.) If you work at [facility], you must also be cleared by Employee Occupational Health and Safety to return to work.        If you receive a negative COVID-19 test result and did not have a high risk exposure to someone with a known positive COVID-19 test, you can return to work once you're free of fever for 24 hours without fever-reducing medication and your symptoms are improving or resolved.      If you receive a negative COVID-19 test and If you had a high risk exposure to someone who has tested positive for COVID-19 then you can return to work 14 days after your last contact  with the positive individual    Note: If you have ongoing exposure to the covid positive person, this quarantine period may be more than 14 days. (For example, if you are continued to be exposed to your child who tested positive and cannot isolate from them, then the quarantine of 7-14 days can't start until your child is no longer contagious. This is typically 10 days from onset of the child's symptoms. So the total duration may be 17-24 days in this case.)    Sign up for [employer].   We know it's scary to hear that you might have COVID-19. We want to track your symptoms to make sure you're okay over the next 2 weeks. Please look for an email from [employer]--this is a free, online program that we'll use to keep in touch. To sign up, follow the link in the email you will receive. Learn more at [email or web address]    How can I take care of myself?    Get lots of rest. Drink extra fluids (unless a doctor has told you not to)    Take Tylenol (acetaminophen) or ibuprofen for fever or pain. If you have liver or kidney problems, ask your family doctor if it's okay to take Tylenol o ibuprofen    If you have other health problems (like cancer, heart failure, an organ transplant or severe kidney disease): Call your specialty clinic if you don't feel better in the next 2 days.    Know when to call 911. Emergency warning signs include:  o Trouble breathing or shortness of breath  o Pain or pressure in the chest that doesn't go away  o Feeling confused like you haven't felt before, or not being able to wake up  o Bluish-colored lips or face    Where can I get more information?  [facility] [place] - About COVID-19:   [email or web address]    CDC - What to Do If You're Sick:   www.cdc.gov/coronavirus/2019-ncov/about/steps-when-sick.html

## 2021-09-17 ENCOUNTER — ALLIED HEALTH/NURSE VISIT (OUTPATIENT)
Dept: FAMILY MEDICINE | Facility: CLINIC | Age: 56
End: 2021-09-17
Payer: COMMERCIAL

## 2021-09-17 DIAGNOSIS — Z23 NEED FOR VACCINATION: Primary | ICD-10-CM

## 2021-09-17 PROCEDURE — 99207 PR NO CHARGE NURSE ONLY: CPT

## 2021-09-17 PROCEDURE — 90471 IMMUNIZATION ADMIN: CPT

## 2021-09-17 PROCEDURE — 90750 HZV VACC RECOMBINANT IM: CPT

## 2021-09-25 ENCOUNTER — HEALTH MAINTENANCE LETTER (OUTPATIENT)
Age: 56
End: 2021-09-25

## 2022-02-22 ASSESSMENT — ENCOUNTER SYMPTOMS
SHORTNESS OF BREATH: 0
DIARRHEA: 0
ABDOMINAL PAIN: 0
PARESTHESIAS: 0
DYSURIA: 0
PALPITATIONS: 0
HEARTBURN: 0
NAUSEA: 0
COUGH: 0
EYE PAIN: 0
WEAKNESS: 0
DIZZINESS: 0
ARTHRALGIAS: 0
NERVOUS/ANXIOUS: 0
HEADACHES: 0
CHILLS: 0
HEMATURIA: 0
JOINT SWELLING: 0
MYALGIAS: 0
FEVER: 0
SORE THROAT: 0
CONSTIPATION: 0
FREQUENCY: 0
HEMATOCHEZIA: 0

## 2022-02-25 ENCOUNTER — OFFICE VISIT (OUTPATIENT)
Dept: FAMILY MEDICINE | Facility: CLINIC | Age: 57
End: 2022-02-25
Payer: COMMERCIAL

## 2022-02-25 VITALS
HEART RATE: 102 BPM | WEIGHT: 200.6 LBS | TEMPERATURE: 97.2 F | DIASTOLIC BLOOD PRESSURE: 83 MMHG | BODY MASS INDEX: 25.74 KG/M2 | HEIGHT: 74 IN | RESPIRATION RATE: 20 BRPM | OXYGEN SATURATION: 97 % | SYSTOLIC BLOOD PRESSURE: 137 MMHG

## 2022-02-25 DIAGNOSIS — R73.9 BLOOD GLUCOSE ELEVATED: ICD-10-CM

## 2022-02-25 DIAGNOSIS — F17.200 SMOKER: ICD-10-CM

## 2022-02-25 DIAGNOSIS — Z13.220 SCREENING FOR HYPERLIPIDEMIA: ICD-10-CM

## 2022-02-25 DIAGNOSIS — Z80.42 FAMILY HISTORY OF PROSTATE CANCER: Primary | ICD-10-CM

## 2022-02-25 DIAGNOSIS — Z12.5 SCREENING FOR PROSTATE CANCER: ICD-10-CM

## 2022-02-25 DIAGNOSIS — Z00.00 ENCOUNTER FOR ANNUAL PHYSICAL EXAM: ICD-10-CM

## 2022-02-25 LAB
ALBUMIN SERPL-MCNC: 3.9 G/DL (ref 3.4–5)
ALP SERPL-CCNC: 64 U/L (ref 40–150)
ALT SERPL W P-5'-P-CCNC: 25 U/L (ref 0–70)
ANION GAP SERPL CALCULATED.3IONS-SCNC: 7 MMOL/L (ref 3–14)
AST SERPL W P-5'-P-CCNC: 12 U/L (ref 0–45)
BILIRUB SERPL-MCNC: 0.7 MG/DL (ref 0.2–1.3)
BUN SERPL-MCNC: 23 MG/DL (ref 7–30)
CALCIUM SERPL-MCNC: 9.1 MG/DL (ref 8.5–10.1)
CHLORIDE BLD-SCNC: 107 MMOL/L (ref 94–109)
CHOLEST SERPL-MCNC: 170 MG/DL
CO2 SERPL-SCNC: 26 MMOL/L (ref 20–32)
CREAT SERPL-MCNC: 0.91 MG/DL (ref 0.66–1.25)
FASTING STATUS PATIENT QL REPORTED: YES
GFR SERPL CREATININE-BSD FRML MDRD: >90 ML/MIN/1.73M2
GLUCOSE BLD-MCNC: 119 MG/DL (ref 70–99)
HDLC SERPL-MCNC: 46 MG/DL
LDLC SERPL CALC-MCNC: 89 MG/DL
NONHDLC SERPL-MCNC: 124 MG/DL
POTASSIUM BLD-SCNC: 3.9 MMOL/L (ref 3.4–5.3)
PROT SERPL-MCNC: 7.3 G/DL (ref 6.8–8.8)
PSA SERPL-MCNC: 0.98 UG/L (ref 0–4)
SODIUM SERPL-SCNC: 140 MMOL/L (ref 133–144)
TRIGL SERPL-MCNC: 173 MG/DL

## 2022-02-25 PROCEDURE — G0103 PSA SCREENING: HCPCS | Performed by: FAMILY MEDICINE

## 2022-02-25 PROCEDURE — 99396 PREV VISIT EST AGE 40-64: CPT | Performed by: FAMILY MEDICINE

## 2022-02-25 PROCEDURE — 80053 COMPREHEN METABOLIC PANEL: CPT | Performed by: FAMILY MEDICINE

## 2022-02-25 PROCEDURE — 80061 LIPID PANEL: CPT | Performed by: FAMILY MEDICINE

## 2022-02-25 PROCEDURE — 36415 COLL VENOUS BLD VENIPUNCTURE: CPT | Performed by: FAMILY MEDICINE

## 2022-02-25 ASSESSMENT — ENCOUNTER SYMPTOMS
HEARTBURN: 0
SORE THROAT: 0
SHORTNESS OF BREATH: 0
NERVOUS/ANXIOUS: 0
WEAKNESS: 0
PALPITATIONS: 0
FEVER: 0
ABDOMINAL PAIN: 0
DYSURIA: 0
FREQUENCY: 0
CHILLS: 0
PARESTHESIAS: 0
HEMATOCHEZIA: 0
JOINT SWELLING: 0
HEMATURIA: 0
CONSTIPATION: 0
HEADACHES: 0
NAUSEA: 0
DIARRHEA: 0
EYE PAIN: 0
ARTHRALGIAS: 0
COUGH: 0
MYALGIAS: 0
DIZZINESS: 0

## 2022-02-25 NOTE — PROGRESS NOTES
SUBJECTIVE:   CC: Kip Winn is an 56 year old male who presents for preventative health visit.       Patient has been advised of split billing requirements and indicates understanding: Yes  Healthy Habits:     Getting at least 3 servings of Calcium per day:  Yes    Bi-annual eye exam:  Yes    Dental care twice a year:  Yes    Sleep apnea or symptoms of sleep apnea:  None    Diet:  Regular (no restrictions)    Frequency of exercise:  1 day/week    Duration of exercise:  15-30 minutes    Taking medications regularly:  Yes    Medication side effects:  Not applicable    PHQ-2 Total Score: 0    Additional concerns today:  No    Patient is not currently on any prescription medication.  In the past he has issues with high triglyceride however he denies any excessive alcohol intake.  Lifestyle is somewhat active but he thinks he can do better.    Today's PHQ-2 Score:   PHQ-2 ( 1999 Pfizer) 2/22/2022   Q1: Little interest or pleasure in doing things 0   Q2: Feeling down, depressed or hopeless 0   PHQ-2 Score 0   PHQ-2 Total Score (12-17 Years)- Positive if 3 or more points; Administer PHQ-A if positive -   Q1: Little interest or pleasure in doing things Not at all   Q2: Feeling down, depressed or hopeless Not at all   PHQ-2 Score 0       Abuse: Current or Past(Physical, Sexual or Emotional)- No  Do you feel safe in your environment? Yes        Social History     Tobacco Use     Smoking status: Light Tobacco Smoker     Packs/day: 0.50     Years: 25.00     Pack years: 12.50     Types: Cigarettes     Start date: 8/1/1980     Last attempt to quit: 11/7/2018     Years since quitting: 3.3     Smokeless tobacco: Never Used     Tobacco comment: Socially   Substance Use Topics     Alcohol use: Yes     Alcohol/week: 0.0 standard drinks     Comment: socially     If you drink alcohol do you typically have >3 drinks per day or >7 drinks per week? No      No flowsheet data found.    Last PSA:   PSA   Date Value Ref Range Status  "  02/25/2021 0.88 0 - 4 ug/L Final     Comment:     Assay Method:  Chemiluminescence using Siemens Vista analyzer       Reviewed orders with patient. Reviewed health maintenance and updated orders accordingly - Yes  Lab work is in process    Reviewed and updated as needed this visit by clinical staff   Tobacco  Allergies  Meds   Med Hx  Surg Hx  Fam Hx  Soc Hx        Reviewed and updated as needed this visit by Provider                     Review of Systems   Constitutional: Negative for chills and fever.   HENT: Negative for congestion, ear pain, hearing loss and sore throat.    Eyes: Negative for pain and visual disturbance.   Respiratory: Negative for cough and shortness of breath.    Cardiovascular: Negative for chest pain, palpitations and peripheral edema.   Gastrointestinal: Negative for abdominal pain, constipation, diarrhea, heartburn, hematochezia and nausea.   Genitourinary: Negative for dysuria, frequency, genital sores, hematuria, impotence, penile discharge and urgency.   Musculoskeletal: Negative for arthralgias, joint swelling and myalgias.   Skin: Negative for rash.   Neurological: Negative for dizziness, weakness, headaches and paresthesias.   Psychiatric/Behavioral: Negative for mood changes. The patient is not nervous/anxious.        OBJECTIVE:   /83 (BP Location: Right arm, Cuff Size: Adult Regular)   Pulse 102   Temp 97.2  F (36.2  C) (Tympanic)   Resp 20   Ht 1.887 m (6' 2.29\")   Wt 91 kg (200 lb 9.6 oz)   SpO2 97%   BMI 25.55 kg/m      Physical Exam  GENERAL: healthy, alert and no distress  EYES: Eyes grossly normal to inspection, PERRL and conjunctivae and sclerae normal  HENT: ear canals and TM's normal, nose and mouth without ulcers or lesions  NECK: no adenopathy, no asymmetry, masses, or scars and thyroid normal to palpation  RESP: lungs clear to auscultation - no rales, rhonchi or wheezes  CV: regular rate and rhythm, normal S1 S2, no S3 or S4, no murmur, click or " "rub, no peripheral edema and peripheral pulses strong  ABDOMEN: soft, nontender, no hepatosplenomegaly, no masses and bowel sounds normal  MS: no gross musculoskeletal defects noted, no edema  SKIN: no suspicious lesions or rashes  NEURO: Normal strength and tone, mentation intact and speech normal  PSYCH: mentation appears normal, affect normal/bright    Diagnostic Test Results:  Labs reviewed in Epic    ASSESSMENT/PLAN:   Kip was seen today for physical.    Diagnoses and all orders for this visit:    Family history of prostate cancer  -     PSA, screen; Future  -     PSA, screen    Smoker    Screening for hyperlipidemia  -     Comprehensive metabolic panel; Future  -     Lipid Profile; Future  -     Comprehensive metabolic panel  -     Lipid Profile    Screening for prostate cancer  -     PSA, screen; Future  -     PSA, screen    Encounter for annual physical exam  -     REVIEW OF HEALTH MAINTENANCE PROTOCOL ORDERS  -     Comprehensive metabolic panel; Future  -     Lipid Profile; Future  -     PSA, screen; Future  -     Comprehensive metabolic panel  -     Lipid Profile  -     PSA, screen        Patient has been advised of split billing requirements and indicates understanding: Yes    COUNSELING:   Reviewed preventive health counseling, as reflected in patient instructions       Consider AAA screening for ages 65-75 and smoking history       Regular exercise    Estimated body mass index is 25.55 kg/m  as calculated from the following:    Height as of this encounter: 1.887 m (6' 2.29\").    Weight as of this encounter: 91 kg (200 lb 9.6 oz).         He reports that he has been smoking cigarettes. He started smoking about 41 years ago. He has a 12.50 pack-year smoking history. He has never used smokeless tobacco.  Tobacco Cessation Action Plan:   Information offered: Patient not interested at this time      Counseling Resources:  ATP IV Guidelines  Pooled Cohorts Equation Calculator  FRAX Risk Assessment  ICSI " Preventive Guidelines  Dietary Guidelines for Americans, 2010  USDA's MyPlate  ASA Prophylaxis  Lung CA Screening    Dany Richardson MD  [facility]

## 2022-03-24 ENCOUNTER — LAB (OUTPATIENT)
Dept: LAB | Facility: CLINIC | Age: 57
End: 2022-03-24
Payer: COMMERCIAL

## 2022-03-24 DIAGNOSIS — R73.9 BLOOD GLUCOSE ELEVATED: ICD-10-CM

## 2022-03-24 LAB — HBA1C MFR BLD: 5.9 % (ref 0–5.6)

## 2022-03-24 PROCEDURE — 83036 HEMOGLOBIN GLYCOSYLATED A1C: CPT

## 2022-03-24 PROCEDURE — 36415 COLL VENOUS BLD VENIPUNCTURE: CPT

## 2022-08-29 ENCOUNTER — E-VISIT (OUTPATIENT)
Dept: FAMILY MEDICINE | Facility: CLINIC | Age: 57
End: 2022-08-29
Payer: COMMERCIAL
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
## 2022-08-29 PROCEDURE — 99421 OL DIG E/M SVC 5-10 MIN: CPT | Performed by: FAMILY MEDICINE

## 2022-08-29 RX ORDER — TRIAMCINOLONE ACETONIDE 1 MG/G
OINTMENT TOPICAL 2 TIMES DAILY
Qty: 15 G | Refills: 1 | Status: SHIPPED | OUTPATIENT
Start: 2022-08-29 | End: 2023-03-02

## 2022-11-09 NOTE — PROGRESS NOTES
[facility] Dermatology Note  Encounter Date: Nov 10, 2022  Office Visit     Dermatology Problem List:  0. NUB, left dorsal hand s/p shave biopsy 11/10/2022  ____________________________________________    Assessment & Plan:    # NUB, left dorsal hand ddx lentigo vs MM vs Spitz nevus vs other  - Shave biopsy today; see procedure note below.     # Benign lesions: Multiple benign nevi, solar lentigos, seborrheic keratoses, cherry angiomas. Explained to patient benign nature of lesion. No treatment is necessary at this time unless the lesion changes or becomes symptomatic.   - ABCDs of melanoma were discussed and self skin checks were advised.  - Sun precaution was advised including the use of sun screens of SPF 30 or higher, sun protective clothing, and avoidance of tanning beds.  - Continue annual skin exams    Procedures Performed:   - Shave biopsy procedure note, location(s): see above. After discussion of benefits and risks including but not limited to bleeding, infection, scar, incomplete removal, recurrence, and non-diagnostic biopsy, written consent and photographs were obtained. The area was cleaned with isopropyl alcohol. 0.5mL of 1% lidocaine with epinephrine was injected to obtain adequate anesthesia of lesion(s). Shave biopsy at site(s) performed. Hemostasis was achieved with aluminium chloride. Petrolatum ointment and a sterile dressing were applied. The patient tolerated the procedure and no complications were noted. The patient was provided with verbal and written post care instructions.    Follow-up: 1 year(s) in-person, or earlier pending pathology or for new or changing lesions    Staff and Scribe:     Scribe Disclosure:  Zoran ECHEVERRIA, am serving as a scribe to document services personally performed by Karon Soto PA-C based on data collection and the provider's statements to me.   Provider Disclosure:   The documentation recorded by the scribe accurately reflects the  services I personally performed and the decisions made by me.    All risks, benefits and alternatives were discussed with patient.  Patient is in agreement and understands the assessment and plan.  All questions were answered.    Karon Soto PA-C, MPAS  [facility]: Phone: [phone], Fax: [phone]  [facility]: Phone: [phone],  Fax: [phone]  [facility]: Phone: [phone], Fax: [phone]  ____________________________________________    CC: Derm Problem and Skin Check (FBSC)    HPI:  Mr. Kip Winn is a(n) 57 year old male who presents today as a new patient for a FBSE. He endorses some spots of concern on his neck and face he would like examined. He does not have a family history of skin cancer. He endorses sun screen use. Notes he has had that dark spot on his hand since childhood. Thinks it was from pencil lead.     Patient is otherwise feeling well, without additional skin concerns.    Labs Reviewed:  N/A    Physical Exam:  Vitals: There were no vitals taken for this visit.  SKIN: Total skin excluding the undergarment areas was performed. The exam included the head/face, neck, both arms, chest, back, abdomen, both legs, digits and/or nails.   - Johnson I  - 3 mm brown and blue macule on the left dorsal hand.  - There are dome shaped bright red papules on the trunk and extremities.   - Multiple regular brown pigmented macules and papules are identified on the trunk and extremities.   - Scattered brown macules on sun exposed areas.  - There are waxy stuck on tan to brown papules on the trunk and extremities.   - No other lesions of concern on areas examined.             Medications:  Current Outpatient Medications   Medication     Ascorbic Acid (VITAMIN C PO)     aspirin 81 MG EC tablet     MULTI-VITAMIN OR TABS     VITAMIN D PO     triamcinolone (KENALOG) 0.1 % external  ointment     No current facility-administered medications for this visit.      Past Medical History:   Patient Active Problem List   Diagnosis     Smoker     Family history of prostate cancer     Hemangioma of forehead     Low HDL (under 40)     Herniated nucleus pulposus, L4-5 right     Past Medical History:   Diagnosis Date     Fatigue      Fatigue 7/1/2011     Lumbar disc herniation     L4-5     Smoker 7/1/2011        CC Dany Richardson MD  [address] [place] [address] [place] [address] on close of this encounter.

## 2022-11-10 ENCOUNTER — OFFICE VISIT (OUTPATIENT)
Dept: FAMILY MEDICINE | Facility: CLINIC | Age: 57
End: 2022-11-10
Attending: FAMILY MEDICINE
Payer: COMMERCIAL

## 2022-11-10 DIAGNOSIS — D49.2 NEOPLASM OF UNSPECIFIED BEHAVIOR OF BONE, SOFT TISSUE, AND SKIN: ICD-10-CM

## 2022-11-10 DIAGNOSIS — Z12.83 SKIN CANCER SCREENING: ICD-10-CM

## 2022-11-10 DIAGNOSIS — L82.1 SEBORRHEIC KERATOSIS: Primary | ICD-10-CM

## 2022-11-10 DIAGNOSIS — D22.9 MULTIPLE BENIGN NEVI: ICD-10-CM

## 2022-11-10 DIAGNOSIS — L81.4 SOLAR LENTIGO: ICD-10-CM

## 2022-11-10 DIAGNOSIS — D18.01 CHERRY ANGIOMA: ICD-10-CM

## 2022-11-10 PROCEDURE — 88305 TISSUE EXAM BY PATHOLOGIST: CPT | Performed by: DERMATOLOGY

## 2022-11-10 PROCEDURE — 99203 OFFICE O/P NEW LOW 30 MIN: CPT | Mod: 25 | Performed by: PHYSICIAN ASSISTANT

## 2022-11-10 PROCEDURE — 11102 TANGNTL BX SKIN SINGLE LES: CPT | Performed by: PHYSICIAN ASSISTANT

## 2022-11-10 ASSESSMENT — PAIN SCALES - GENERAL: PAINLEVEL: NO PAIN (0)

## 2022-11-10 NOTE — PATIENT INSTRUCTIONS
Wound Care After a Biopsy    What is a skin biopsy?  A skin biopsy allows the doctor to examine a very small piece of tissue under the microscope to determine the diagnosis and the best treatment for the skin condition. A local anesthetic (numbing medicine)  is injected with a very small needle into the skin area to be tested. A small piece of skin is taken from the area. Sometimes a suture (stitch) is used.     What are the risks of a skin biopsy?  I will experience scar, bleeding, swelling, pain, crusting and redness. I may experience incomplete removal or recurrence. Risks of this procedure are excessive bleeding, bruising, infection, nerve damage, numbness, thick (hypertrophic or keloidal) scar and non-diagnostic biopsy.    How should I care for my wound for the first 24 hours?  Keep the wound dry and covered for 24 hours  If it bleeds, hold direct pressure on the area for 15 minutes. If bleeding does not stop then go to the emergency room  Avoid strenuous exercise the first 1-2 days or as your doctor instructs you    How should I care for the wound after 24 hours?  After 24 hours, remove the bandage  You may bathe or shower as normal  If you had a scalp biopsy, you can shampoo as usual and can use shower water to clean the biopsy site daily  Clean the wound twice a day with gentle soap and water  Do not scrub, be gentle  Apply white petroleum/Vaseline after cleaning the wound with a cotton swab or a clean finger, and keep the site covered with a Bandaid /bandage. Bandages are not necessary with a scalp biopsy  If you are unable to cover the site with a Bandaid /bandage, re-apply ointment 2-3 times a day to keep the site moist. Moisture will help with healing  Avoid strenuous activity for first 1-2 days  Avoid lakes, rivers, pools, and oceans until the stitches are removed or the site is healed    How do I clean my wound?  Wash hands thoroughly with soap or use hand  before all wound care  Clean the  wound with gentle soap and water  Apply white petroleum/Vaseline  to wound after it is clean  Replace the Bandaid /bandage to keep the wound covered for the first few days or as instructed by your doctor  If you had a scalp biopsy, warm shower water to the area on a daily basis should suffice    What should I use to clean my wound?   Cotton-tipped applicators (Qtips )  White petroleum jelly (Vaseline ). Use a clean new container and use Q-tips to apply.  Bandaids   as needed  Gentle soap     How should I care for my wound long term?  Do not get your wound dirty  Keep up with wound care for one week or until the area is healed.  A small scab will form and fall off by itself when the area is completely healed. The area will be red and will become pink in color as it heals. Sun protection is very important for how your scar will turn out. Sunscreen with an SPF 30 or greater is recommended once the area is healed.  You should have some soreness but it should be mild and slowly go away over several days. Talk to your doctor about using tylenol for pain,    When should I call my doctor?  If you have increased:   Pain or swelling  Pus or drainage (clear or slightly yellow drainage is ok)  Temperature over 100F  Spreading redness or warmth around wound    When will I hear about my results?  The biopsy results can take 2 weeks to come back.  Your results will automatically release to [employer] before your provider has even reviewed them.  The clinic will call you with the results, send you a [email or web address] message, or have you schedule a follow-up clinic or phone time to discuss the results.  Contact our clinics if you do not hear from us in 2 weeks.    Who should I call with questions?  [facility]: [phone]  [facility]: [phone]  For urgent needs outside of business hours call the [facility] at [phone] and ask for the dermatology resident on call        Patient Education     Checking for Skin Cancer  You can find cancer early by checking your skin each month. There are 3 kinds of skin cancer. They are melanoma, basal cell carcinoma, and squamous cell carcinoma. Doing monthly skin checks is the best way to find new marks or skin changes. Follow the instructions below for checking your skin.   The ABCDEs of checking moles for melanoma   Check your moles or growths for signs of melanoma using ABCDE:   Asymmetry: the sides of the mole or growth don t match  Border: the edges are ragged, notched, or blurred  Color: the color within the mole or growth varies  Diameter: the mole or growth is larger than 6 mm (size of a pencil eraser)  Evolving: the size, shape, or color of the mole or growth is changing (evolving is not shown in the images below)    Checking for other types of skin cancer  Basal cell carcinoma or squamous cell carcinoma have symptoms such as:     A spot or mole that looks different from all other marks on your skin  Changes in how an area feels, such as itching, tenderness, or pain  Changes in the skin's surface, such as oozing, bleeding, or scaliness  A sore that does not heal  New swelling or redness beyond the border of a mole    Who s at risk?  Anyone can get skin cancer. But you are at greater risk if you have:   Fair skin, light-colored hair, or light-colored eyes  Many moles or abnormal moles on your skin  A history of sunburns from sunlight or tanning beds  A family history of skin cancer  A history of exposure to radiation or chemicals  A weakened immune system  If you have had skin cancer in the past, you are at risk for recurring skin cancer.   How to check your skin  Do your monthly skin checkups in front of a full-length mirror. Check all parts of your body, including your:   Head (ears, face, neck, and scalp)  Torso (front, back, and sides)  Arms (tops, undersides, upper, and lower armpits)  Hands (palms, backs, and fingers, including  under the nails)  Buttocks and genitals  Legs (front, back, and sides)  Feet (tops, soles, toes, including under the nails, and between toes)  If you have a lot of moles, take digital photos of them each month. Make sure to take photos both up close and from a distance. These can help you see if any moles change over time.   Most skin changes are not cancer. But if you see any changes in your skin, call your doctor right away. Only he or she can diagnose a problem. If you have skin cancer, seeing your doctor can be the first step toward getting the treatment that could save your life.   [employer] last reviewed this educational content on 4/1/2019 2000-2020 [employer]. [address] [place] [address], [place] [address]. All rights reserved. This information is not intended as a substitute for professional medical care. Always follow your healthcare professional's instructions.       When should I call my doctor?  If you are worsening or not improving, please, contact us or seek urgent care as noted below.     Who should I call with questions (adults)?  [facility] (adult and pediatric): [phone]  [facility] (adult): [phone]  For urgent needs outside of business hours call the [facility] at [phone] and ask for the dermatology resident on call to be paged  If this is a medical emergency and you are unable to reach an ER, Call [phone]    Who should I call with questions (pediatric)?  [facility]- Pediatric Dermatology  Dr. Malathi Mccain, Dr. Som Macias, Dr. Patricia Elias, JUVENCIO Maynard, Dr. Ashley Patino, Dr. Brigitte Rodriguez & Dr. Indio Morales  Non-urgent nurse triage line; [phone]- [facility] Coordinatorkatie Gutierrez (/Complex ) [phone]    If you need a prescription refill, please contact your pharmacy. Refills are approved or denied by our  Physicians during normal business hours, Monday through Fridays  Per office policy, refills will not be granted if you have not been seen within the past year (or sooner depending on your child's condition)    Scheduling Information:  Pediatric Appointment Scheduling and Call Center [phone]  Radiology Scheduling- [phone]  Sedation Unit Scheduling- [phone]  [place] Scheduling- General [phone]; Pediatric Dermatology [phone]  Main  Services: [phone]  Wolof: [phone]  Greek: [phone]  Hmong/Jamaican/Czech: [phone]  Preadmission Nursing Department Fax Number: [phone] (Fax all pre-operative paperwork to this number)    For urgent matters arising during evenings, weekends, or holidays that cannot wait for normal business hours please call [phone] and ask for the dermatology resident on call to be paged.

## 2022-11-10 NOTE — LETTER
11/10/2022         RE: Kip Winn  [address]  [place] [address]        Dear Colleague,    Thank you for referring your patient, Kip Winn, to the [facility] [place]. Please see a copy of my visit note below.    [facility] Dermatology Note  Encounter Date: Nov 10, 2022  Office Visit     Dermatology Problem List:  0. NUB, left dorsal hand s/p shave biopsy 11/10/2022  ____________________________________________    Assessment & Plan:    # NUB, left dorsal hand ddx lentigo vs MM vs Spitz nevus vs other  - Shave biopsy today; see procedure note below.     # Benign lesions: Multiple benign nevi, solar lentigos, seborrheic keratoses, cherry angiomas. Explained to patient benign nature of lesion. No treatment is necessary at this time unless the lesion changes or becomes symptomatic.   - ABCDs of melanoma were discussed and self skin checks were advised.  - Sun precaution was advised including the use of sun screens of SPF 30 or higher, sun protective clothing, and avoidance of tanning beds.  - Continue annual skin exams    Procedures Performed:   - Shave biopsy procedure note, location(s): see above. After discussion of benefits and risks including but not limited to bleeding, infection, scar, incomplete removal, recurrence, and non-diagnostic biopsy, written consent and photographs were obtained. The area was cleaned with isopropyl alcohol. 0.5mL of 1% lidocaine with epinephrine was injected to obtain adequate anesthesia of lesion(s). Shave biopsy at site(s) performed. Hemostasis was achieved with aluminium chloride. Petrolatum ointment and a sterile dressing were applied. The patient tolerated the procedure and no complications were noted. The patient was provided with verbal and written post care instructions.    Follow-up: 1 year(s) in-person, or earlier pending pathology or for new or changing lesions    Staff and Scribe:     Scribe Disclosure:  Zoran ECHEVERRIA  Jennifer, abdiaziz serving as a scribe to document services personally performed by Karon Soto PA-C based on data collection and the provider's statements to me.   Provider Disclosure:   The documentation recorded by the scribe accurately reflects the services I personally performed and the decisions made by me.    All risks, benefits and alternatives were discussed with patient.  Patient is in agreement and understands the assessment and plan.  All questions were answered.    Karon Soto PA-C, MPAS  [facility] [place]: Phone: [phone], Fax: [phone]  [facility]: Phone: [phone],  Fax: [phone]  [facility]: Phone: [phone], Fax: [phone]  ____________________________________________    CC: Derm Problem and Skin Check (FBSC)    HPI:  Mr. Kip Winn is a(n) 57 year old male who presents today as a new patient for a FBSE. He endorses some spots of concern on his neck and face he would like examined. He does not have a family history of skin cancer. He endorses sun screen use. Notes he has had that dark spot on his hand since childhood. Thinks it was from pencil lead.     Patient is otherwise feeling well, without additional skin concerns.    Labs Reviewed:  N/A    Physical Exam:  Vitals: There were no vitals taken for this visit.  SKIN: Total skin excluding the undergarment areas was performed. The exam included the head/face, neck, both arms, chest, back, abdomen, both legs, digits and/or nails.   - Johnson I  - 3 mm brown and blue macule on the left dorsal hand.  - There are dome shaped bright red papules on the trunk and extremities.   - Multiple regular brown pigmented macules and papules are identified on the trunk and extremities.   - Scattered brown macules on sun exposed areas.  - There are waxy stuck on tan to brown papules on the trunk and extremities.   - No other lesions of  concern on areas examined.             Medications:  Current Outpatient Medications   Medication     Ascorbic Acid (VITAMIN C PO)     aspirin 81 MG EC tablet     MULTI-VITAMIN OR TABS     VITAMIN D PO     triamcinolone (KENALOG) 0.1 % external ointment     No current facility-administered medications for this visit.      Past Medical History:   Patient Active Problem List   Diagnosis     Smoker     Family history of prostate cancer     Hemangioma of forehead     Low HDL (under 40)     Herniated nucleus pulposus, L4-5 right     Past Medical History:   Diagnosis Date     Fatigue      Fatigue 7/1/2011     Lumbar disc herniation     L4-5     Smoker 7/1/2011        CC Dany Richardson MD  [address] [place] [address] [place] [address] on close of this encounter.      Again, thank you for allowing me to participate in the care of your patient.        Sincerely,        Karon Soto PA-C

## 2022-11-15 LAB
PATH REPORT.COMMENTS IMP SPEC: NORMAL
PATH REPORT.COMMENTS IMP SPEC: NORMAL
PATH REPORT.FINAL DX SPEC: NORMAL
PATH REPORT.GROSS SPEC: NORMAL
PATH REPORT.MICROSCOPIC SPEC OTHER STN: NORMAL
PATH REPORT.RELEVANT HX SPEC: NORMAL

## 2023-01-28 ENCOUNTER — NURSE TRIAGE (OUTPATIENT)
Dept: NURSING | Facility: CLINIC | Age: 58
End: 2023-01-28
Payer: COMMERCIAL

## 2023-01-28 DIAGNOSIS — U07.1 INFECTION DUE TO 2019 NOVEL CORONAVIRUS: Primary | ICD-10-CM

## 2023-01-28 DIAGNOSIS — Z20.822 SUSPECTED COVID-19 VIRUS INFECTION: ICD-10-CM

## 2023-01-28 NOTE — TELEPHONE ENCOUNTER
Patient calling about Covid.  tested positive on Tuesday. Patient has been testing negative until this morning.      COVID Positive/Requesting COVID treatment    Patient is positive for COVID and requesting treatment options.    Date of positive COVID test (PCR or at home)? 1/28/2023  Current COVID symptoms: fatigue, headache and congestion or runny nose  Date COVID symptoms began: 1/27/2023    Patient is high risk as a smoker.     Message should be routed to clinic RN pool. Best phone number to use for call back: [phone].    Care advice given. Patient agrees to call back with worsening symptoms.   Lauren Wesley RN   01/28/23 11:56 AM  [employer] Nurse Advisor    Reason for Disposition    [1] HIGH RISK for severe COVID complications (e.g., weak immune system, age > 64 years, obesity with BMI of 30 or higher, pregnant, chronic lung disease or other chronic medical condition) AND [2] COVID symptoms (e.g., cough, fever)  (Exceptions: Already seen by PCP and no new or worsening symptoms.)    Additional Information    Negative: SEVERE difficulty breathing (e.g., struggling for each breath, speaks in single words)    Negative: Difficult to awaken or acting confused (e.g., disoriented, slurred speech)    Negative: Bluish (or gray) lips or face now    Negative: Shock suspected (e.g., cold/pale/clammy skin, too weak to stand, low BP, rapid pulse)    Negative: Sounds like a life-threatening emergency to the triager    Negative: [1] Diagnosed or suspected COVID-19 AND [2] symptoms lasting 3 or more weeks    Negative: [1] COVID-19 exposure AND [2] no symptoms    Negative: COVID-19 vaccine reaction suspected (e.g., fever, headache, muscle aches) occurring 1 to 3 days after getting vaccine    Negative: COVID-19 vaccine, questions about    Negative: [1] Lives with someone known to have influenza (flu test positive) AND [2] flu-like symptoms (e.g., cough, runny nose, sore throat, SOB; with or without fever)     Negative: [1] Adult with possible COVID-19 symptoms AND [2] triager concerned about severity of symptoms or other causes    Negative: COVID-19 and breastfeeding, questions about    Negative: SEVERE or constant chest pain or pressure  (Exception: Mild central chest pain, present only when coughing.)    Negative: MODERATE difficulty breathing (e.g., speaks in phrases, SOB even at rest, pulse 100-120)    Negative: Headache and stiff neck (can't touch chin to chest)    Negative: Oxygen level (e.g., pulse oximetry) 90 percent or lower    Negative: Chest pain or pressure  (Exception: MILD central chest pain, present only when coughing)    Negative: Patient sounds very sick or weak to the triager    Negative: MILD difficulty breathing (e.g., minimal/no SOB at rest, SOB with walking, pulse <100)    Negative: Fever > 103 F (39.4 C)    Negative: [1] Fever > 101 F (38.3 C) AND [2] over 60 years of age    Negative: [1] Fever > 100.0 F (37.8 C) AND [2] bedridden (e.g., nursing home patient, CVA, chronic illness, recovering from surgery)    Protocols used: CORONAVIRUS (COVID-19) DIAGNOSED OR [id number]

## 2023-01-28 NOTE — TELEPHONE ENCOUNTER
RN COVID TREATMENT VISIT  01/28/23    Kip Winn  57 year old  Current weight? 190    Has the patient been seen by a primary care provider at an [facility] or [facility] within the past two years? Yes.   Have you been in close proximity to/do you have a known exposure to a person with a confirmed case of influenza? No.     Date of positive COVID test (PCR or at home)?  1/28/23    Current COVID symptoms: fatigue, headache and congestion or runny nose    Date COVID symptoms began: 1/27/23    Do you have any of the following conditions that place you at risk of being very sick from COVID-19? smoking (current or former)    Is patient eligible to continue? Yes, established patient, 12 years or older weighing at least 88.2 lbs, who has COVID symptoms that started in the past 5 days and is at risk for being very sick from COVID-19.       Have you received monoclonal antibodies or oral antiviral medications since testing positive to COVID-19? No    Are you currently hospitalized for COVID-19? No    Do you have a history of hepatitis? No    Are you currently pregnant or nursing? No    Do you have a clinically significant hypersensitivity to nirmatrelvir, ritonavir, or molnupiravir? No    Do you have any history of severe renal impairment (eGFR < 30mL/min)? No    Do you have any history of hepatic impairment or abnormalities (e.g. hepatic panel, ALT, AST, ALK Phos, bilirubin)? No    Have you had a coronary stent placed in the previous 6 months? No    Is patient eligible to continue?   Yes, patient meets all eligibility requirements for the RN COVID treatment (as denoted by all no responses above).     Current Outpatient Medications   Medication Sig Dispense Refill     Ascorbic Acid (VITAMIN C PO) Take 1 tablet by mouth.       aspirin 81 MG EC tablet Take 81 mg by mouth daily       MULTI-VITAMIN OR TABS 1 TABLET DAILY       triamcinolone (KENALOG) 0.1 % external ointment Apply topically 2 times  daily 15 g 1     VITAMIN D PO Take 1 tablet by mouth.         Medications from List 1 of the standing order (on medications that exclude the use of Paxlovid) that patient is taking: NONE. Is patient taking Sade's Wort? No  Is patient taking Sade's Wort or any meds from List 1? No.   Medications from List 2 of the standing order (on meds that provider needs to adjust) that patient is taking: NONE. Is patient on any of the meds from List 2? No.   Medications from List 3 of standing order (on meds that a RN needs to adjust) that patient is taking: NONE. Is patient on any meds from List 3? No.   In order of efficacy, Paxlovid has an approximate 90% reduction in hospitalization. Paxlovid can possibly cause altered sense of taste, diarrhea (loose, watery stools), high blood pressure, muscle aches.  The other option is molnupiravir which has an approximate 30% reduction in hospitalization. Molnupirarivr can possibly cause diarrhea (loose, watery stools), nausea (feeling sick to your stomach), dizziness, headaches.    Which treatment option does the patient prefer?   Paxlovid.   Lab Results   Component Value Date    GFRESTIMATED >90 02/25/2022       Was last eGFR reduced? No, eGFR 60 or greater/ No Result on record. Patient can receive the normal renal function dose. Paxlovid Rx sent to [place] [facility]   [facility] [phone]    [address] [address] [address]    Hours:  Mon-Fri: 8:00a - 6:00p  Sat-Sun: 8:00a - 4:00p    Drive-thru services available.    Temporary change to home medications: None    All medication adjustments (holds, etc) were discussed with the patient and patient was asked to repeat back (teachback) their med adjustment.  Did patient understand med adjustment? No medication adjustments needed.         Reviewed the following instructions with the patient:    Paxlovid (nimatrelvir and ritonavir)    How it works  Two medicines (nirmatrelvir and ritonavir) are taken  together. They stop the virus from growing. Less amount of virus is easier for your body to fight.    How to take    Medicine comes in a daily container with both medicine tablets. Take by mouth twice daily (once in the morning, once at night) for 5 days.    The number of tablets to take varies by patient.    Don't chew or break capsules. Swallow whole.    When to take  Take as soon as possible after positive COVID-19 test result, and within 5 days of your first symptoms.    Possible side effects  Can cause altered sense of taste, diarrhea (loose, watery stools), high blood pressure, muscle aches.    Brittanie Ring RN

## 2023-02-23 ASSESSMENT — ENCOUNTER SYMPTOMS
DYSURIA: 0
CHILLS: 0
CONSTIPATION: 0
DIZZINESS: 0
ARTHRALGIAS: 0
PARESTHESIAS: 0
HEARTBURN: 0
SHORTNESS OF BREATH: 0
HEMATURIA: 0
NAUSEA: 0
PALPITATIONS: 0
WEAKNESS: 0
ABDOMINAL PAIN: 0
SORE THROAT: 0
FREQUENCY: 0
JOINT SWELLING: 0
EYE PAIN: 0
FEVER: 0
COUGH: 0
MYALGIAS: 0
NERVOUS/ANXIOUS: 0
DIARRHEA: 0
HEMATOCHEZIA: 0
HEADACHES: 0

## 2023-03-02 ENCOUNTER — OFFICE VISIT (OUTPATIENT)
Dept: FAMILY MEDICINE | Facility: CLINIC | Age: 58
End: 2023-03-02
Payer: COMMERCIAL

## 2023-03-02 VITALS
HEIGHT: 74 IN | OXYGEN SATURATION: 97 % | SYSTOLIC BLOOD PRESSURE: 120 MMHG | HEART RATE: 100 BPM | WEIGHT: 188.6 LBS | DIASTOLIC BLOOD PRESSURE: 68 MMHG | BODY MASS INDEX: 24.2 KG/M2 | TEMPERATURE: 97 F | RESPIRATION RATE: 16 BRPM

## 2023-03-02 DIAGNOSIS — Z12.5 SCREENING FOR PROSTATE CANCER: ICD-10-CM

## 2023-03-02 DIAGNOSIS — Z87.891 PERSONAL HISTORY OF TOBACCO USE: ICD-10-CM

## 2023-03-02 DIAGNOSIS — Z12.2 SCREENING FOR LUNG CANCER: ICD-10-CM

## 2023-03-02 DIAGNOSIS — Z13.220 SCREENING FOR HYPERLIPIDEMIA: ICD-10-CM

## 2023-03-02 DIAGNOSIS — Z00.00 ENCOUNTER FOR ANNUAL PHYSICAL EXAM: Primary | ICD-10-CM

## 2023-03-02 LAB
ALBUMIN SERPL BCG-MCNC: 4.4 G/DL (ref 3.5–5.2)
ALP SERPL-CCNC: 61 U/L (ref 40–129)
ALT SERPL W P-5'-P-CCNC: 12 U/L (ref 10–50)
ANION GAP SERPL CALCULATED.3IONS-SCNC: 12 MMOL/L (ref 7–15)
AST SERPL W P-5'-P-CCNC: 23 U/L (ref 10–50)
BILIRUB SERPL-MCNC: 0.7 MG/DL
BUN SERPL-MCNC: 17.3 MG/DL (ref 6–20)
CALCIUM SERPL-MCNC: 9.5 MG/DL (ref 8.6–10)
CHLORIDE SERPL-SCNC: 104 MMOL/L (ref 98–107)
CHOLEST SERPL-MCNC: 169 MG/DL
CREAT SERPL-MCNC: 0.9 MG/DL (ref 0.67–1.17)
DEPRECATED HCO3 PLAS-SCNC: 26 MMOL/L (ref 22–29)
GFR SERPL CREATININE-BSD FRML MDRD: >90 ML/MIN/1.73M2
GLUCOSE SERPL-MCNC: 110 MG/DL (ref 70–99)
HBA1C MFR BLD: 5.8 % (ref 0–5.6)
HDLC SERPL-MCNC: 49 MG/DL
LDLC SERPL CALC-MCNC: 94 MG/DL
NONHDLC SERPL-MCNC: 120 MG/DL
POTASSIUM SERPL-SCNC: 4.2 MMOL/L (ref 3.4–5.3)
PROT SERPL-MCNC: 6.9 G/DL (ref 6.4–8.3)
PSA SERPL-MCNC: 0.81 NG/ML (ref 0–3.5)
SODIUM SERPL-SCNC: 142 MMOL/L (ref 136–145)
TRIGL SERPL-MCNC: 128 MG/DL

## 2023-03-02 PROCEDURE — 36415 COLL VENOUS BLD VENIPUNCTURE: CPT | Performed by: FAMILY MEDICINE

## 2023-03-02 PROCEDURE — G0296 VISIT TO DETERM LDCT ELIG: HCPCS | Performed by: FAMILY MEDICINE

## 2023-03-02 PROCEDURE — 80061 LIPID PANEL: CPT | Performed by: FAMILY MEDICINE

## 2023-03-02 PROCEDURE — G0103 PSA SCREENING: HCPCS | Performed by: FAMILY MEDICINE

## 2023-03-02 PROCEDURE — 99396 PREV VISIT EST AGE 40-64: CPT | Mod: 25 | Performed by: FAMILY MEDICINE

## 2023-03-02 PROCEDURE — 90471 IMMUNIZATION ADMIN: CPT | Performed by: FAMILY MEDICINE

## 2023-03-02 PROCEDURE — 90677 PCV20 VACCINE IM: CPT | Performed by: FAMILY MEDICINE

## 2023-03-02 PROCEDURE — 83036 HEMOGLOBIN GLYCOSYLATED A1C: CPT | Performed by: FAMILY MEDICINE

## 2023-03-02 PROCEDURE — 80053 COMPREHEN METABOLIC PANEL: CPT | Performed by: FAMILY MEDICINE

## 2023-03-02 ASSESSMENT — ENCOUNTER SYMPTOMS
NAUSEA: 0
ABDOMINAL PAIN: 0
MYALGIAS: 0
PALPITATIONS: 0
COUGH: 0
DIZZINESS: 0
CONSTIPATION: 0
SHORTNESS OF BREATH: 0
FEVER: 0
ARTHRALGIAS: 0
WEAKNESS: 0
FREQUENCY: 0
NERVOUS/ANXIOUS: 0
HEARTBURN: 0
DIARRHEA: 0
DYSURIA: 0
PARESTHESIAS: 0
SORE THROAT: 0
HEMATURIA: 0
HEMATOCHEZIA: 0
EYE PAIN: 0
JOINT SWELLING: 0
HEADACHES: 0
CHILLS: 0

## 2023-03-02 ASSESSMENT — PAIN SCALES - GENERAL: PAINLEVEL: NO PAIN (0)

## 2023-03-02 NOTE — PROGRESS NOTES
Lung Cancer Screening Shared Decision Making Visit     Kip Winn, a 57 year old male, is eligible for lung cancer screening    History   Smoking Status     Every Day     Packs/day: 0.50     Years: 25.00     Types: Cigarettes     Start date: 8/1/1980     Last attempt to quit: 11/7/2018   Smokeless Tobacco     Never       I have discussed with patient the risks and benefits of screening for lung cancer with low-dose CT.     The risks include:    radiation exposure: one low dose chest CT has as much ionizing radiation as about 15 chest x-rays, or 6 months of background radiation living in [place]      false positives: most findings/nodules are NOT cancer, but some might still require additional diagnostic evaluation, including biopsy    over-diagnosis: some slow growing cancers that might never have been clinically significant will be detected and treated unnecessarily     The benefit of early detection of lung cancer is contingent upon adherence to annual screening or more frequent follow up if indicated.     Furthermore, to benefit from screening, Kip must be willing and able to undergo diagnostic procedures, if indicated. Although no specific guide is available for determining severity of comorbidities, it is reasonable to withhold screening in patients who have greater mortality risk from other diseases.     We did discuss that the best way to prevent lung cancer is to not smoke.    Some patients may value a numeric estimation of lung cancer risk when evaluating if lung cancer screening is right for them, here is one calculator:    ShouldIScreen  Answers for HPI/ROS submitted by the patient on 2/23/2023  Frequency of exercise:: 4-5 days/week  Getting at least 3 servings of Calcium per day:: Yes  Diet:: Regular (no restrictions)  Taking medications regularly:: Not Applicable  Medication side effects:: Not applicable  Bi-annual eye exam:: Yes  Dental care twice a year:: Yes  Sleep apnea or symptoms of sleep  apnea:: None  abdominal pain: No  Blood in stool: No  Blood in urine: No  chest pain: No  chills: No  congestion: No  constipation: No  cough: No  diarrhea: No  dizziness: No  ear pain: No  eye pain: No  nervous/anxious: No  fever: No  frequency: No  genital sores: No  headaches: No  hearing loss: No  heartburn: No  arthralgias: No  joint swelling: No  peripheral edema: No  mood changes: No  myalgias: No  nausea: No  dysuria: No  palpitations: No  Skin sensation changes: No  sore throat: No  urgency: No  rash: No  shortness of breath: No  visual disturbance: No  weakness: No  impotence: No  penile discharge: No  Additional concerns today:: Yes  Duration of exercise:: 45-60 minutes

## 2023-03-02 NOTE — PROGRESS NOTES
SUBJECTIVE:   CC: Kip is an 57 year old who presents for preventative health visit.   Patient has been advised of split billing requirements and indicates understanding: Yes  Healthy Habits:     Getting at least 3 servings of Calcium per day:  Yes    Bi-annual eye exam:  Yes    Dental care twice a year:  Yes    Sleep apnea or symptoms of sleep apnea:  None    Diet:  Regular (no restrictions)    Frequency of exercise:  4-5 days/week    Duration of exercise:  45-60 minutes    Taking medications regularly:  Not Applicable    Medication side effects:  Not applicable    PHQ-2 Total Score: 0    Additional concerns today:  Yes    Patient denies any new concerns.  He retired last year.  He is feeling otherwise healthy try to be active.  He has a smoking history for almost 40 years.  Sister diagnosed with small cell lung cancer.  Denies any breathing issue shortness of breath now have had any screening of lung cancer.      Today's PHQ-2 Score:   PHQ-2 (  [employer]) 2023   Q1: Little interest or pleasure in doing things 0   Q2: Feeling down, depressed or hopeless 0   PHQ-2 Score 0   PHQ-2 Total Score (12-17 Years)- Positive if 3 or more points; Administer PHQ-A if positive -   Q1: Little interest or pleasure in doing things Not at all   Q2: Feeling down, depressed or hopeless Not at all   PHQ-2 Score 0           Social History     Tobacco Use     Smoking status: Every Day     Packs/day: 0.50     Years: 25.00     Pack years: 12.50     Types: Cigarettes     Start date: 1980     Last attempt to quit: 2018     Years since quittin.3     Smokeless tobacco: Never     Tobacco comments:     Socially   Substance Use Topics     Alcohol use: Yes     Comment: socially         Alcohol Use 2023   Prescreen: >3 drinks/day or >7 drinks/week? No       Last PSA:   PSA   Date Value Ref Range Status   2021 0.88 0 - 4 ug/L Final     Comment:     Assay Method:  Chemiluminescence using Siemens Vista analyzer  "    Prostate Specific Antigen Screen   Date Value Ref Range Status   02/25/2022 0.98 0.00 - 4.00 ug/L Final       Reviewed orders with patient. Reviewed health maintenance and updated orders accordingly - Yes  Lab work is in process    Reviewed and updated as needed this visit by clinical staff   Tobacco  Allergies  Meds              Reviewed and updated as needed this visit by Provider                     Review of Systems   Constitutional: Negative for chills and fever.   HENT: Negative for congestion, ear pain, hearing loss and sore throat.    Eyes: Negative for pain and visual disturbance.   Respiratory: Negative for cough and shortness of breath.    Cardiovascular: Negative for chest pain, palpitations and peripheral edema.   Gastrointestinal: Negative for abdominal pain, constipation, diarrhea, heartburn, hematochezia and nausea.   Genitourinary: Negative for dysuria, frequency, genital sores, hematuria, impotence, penile discharge and urgency.   Musculoskeletal: Negative for arthralgias, joint swelling and myalgias.   Skin: Negative for rash.   Neurological: Negative for dizziness, weakness, headaches and paresthesias.   Psychiatric/Behavioral: Negative for mood changes. The patient is not nervous/anxious.          OBJECTIVE:   /68   Pulse 100   Temp 97  F (36.1  C) (Tympanic)   Resp 16   Ht 1.89 m (6' 2.41\")   Wt 85.5 kg (188 lb 9.6 oz)   SpO2 97%   BMI 23.95 kg/m      Physical Exam  GENERAL: healthy, alert and no distress  EYES: Eyes grossly normal to inspection, PERRL and conjunctivae and sclerae normal  HENT: ear canals and TM's normal, nose and mouth without ulcers or lesions  NECK: no adenopathy, no asymmetry, masses, or scars and thyroid normal to palpation  RESP: lungs clear to auscultation - no rales, rhonchi or wheezes  CV: regular rate and rhythm, normal S1 S2, no S3 or S4, no murmur, click or rub, no peripheral edema and peripheral pulses strong  ABDOMEN: soft, nontender, no " hepatosplenomegaly, no masses and bowel sounds normal  MS: no gross musculoskeletal defects noted, no edema  SKIN: no suspicious lesions or rashes  NEURO: Normal strength and tone, mentation intact and speech normal  PSYCH: mentation appears normal, affect normal/bright    Diagnostic Test Results:  Labs reviewed in Epic    ASSESSMENT/PLAN:   Kip was seen today for physical.    Diagnoses and all orders for this visit:    Encounter for annual physical exam  -     Prostate Specific Antigen Screen; Future  -     Comprehensive metabolic panel; Future  -     Lipid Profile; Future  -     Hemoglobin A1c; Future    Screening for hyperlipidemia  -     Comprehensive metabolic panel; Future  -     Lipid Profile; Future    Screening for prostate cancer  -     Prostate Specific Antigen Screen; Future    Screening for lung cancer  -     CT Chest Lung Cancer Scrn Low Dose wo; Future  Discussed about the CT chest low-dose for screening purposes risk and benefits were discussed he will make sure it is covered by his insurance.  Looking cessation advised he is thinking about it he will let us know.  Personal history of tobacco use  -     Prof fee: Shared Decision Making for Lung Cancer Screening  -     CT Chest Lung Cancer Scrn Low Dose wo; Future    Other orders  -     Pneumococcal 20 Valent Conjugate (Prevnar 20)  -     REVIEW OF HEALTH MAINTENANCE PROTOCOL ORDERS  Advised to check with the pharmacy where he got his last COVID shot whether it was by Valent or not if it is by well and then he no need any new booster.      Patient has been advised of split billing requirements and indicates understanding: Yes      COUNSELING:   Reviewed preventive health counseling, as reflected in patient instructions       Regular exercise       Healthy diet/nutrition        He reports that he has been smoking cigarettes. He started smoking about 42 years ago. He has a 12.50 pack-year smoking history. He has never used smokeless  tobacco.  Nicotine/Tobacco Cessation Plan:   Information offered: Patient not interested at this time          Dany Richardson MD  [facility]

## 2023-03-02 NOTE — PATIENT INSTRUCTIONS
Lung Cancer Screening   Frequently Asked Questions  If you are at high-risk for lung cancer, getting screened with low-dose computed tomography (LDCT) every year can help save your life. This handout offers answers to some of the most common questions about lung cancer screening. If you have other questions, please call [phone] ([phone]).     What is it?  Lung cancer screening uses special X-ray technology to create an image of your lung tissue. The exam is quick and easy and takes less than 10 seconds. We don t give you any medicine or use any needles. You can eat before and after the exam. You don t need to change your clothes as long as the clothing on your chest doesn t contain metal. But, you do need to be able to hold your breath for at least 6 seconds during the exam.    What is the goal of lung cancer screening?  The goal of lung cancer screening is to save lives. Many times, lung cancer is not found until a person starts having physical symptoms. Lung cancer screening can help detect lung cancer in the earliest stages when it may be easier to treat.    Who should be screened for lung cancer?  We suggest lung cancer screening for anyone who is at high-risk for lung cancer. You are in the high-risk group if you:      are between the ages of 55 and 79, and    have smoked at least 1 pack of cigarettes a day for 20 or more years, and    still smoke or have quit within the past 15 years.    However, if you have a new cough or shortness of breath, you should talk to your doctor before being screened.    Why does it matter if I have symptoms?  Certain symptoms can be a sign that you have a condition in your lungs that should be checked and treated by your doctor. These symptoms include fever, chest pain, a new or changing cough, shortness of breath that you have never felt before, coughing up blood or unexplained weight loss. Having any of these symptoms can greatly affect the results of lung  cancer screening.       Should all smokers get an LDCT lung cancer screening exam?  It depends. Lung cancer screening is for a very specific group of men and women who have a history of heavy smoking over a long period of time (see  Who should be screened for lung cancer  above).  I am in the high-risk group, but have been diagnosed with cancer in the past. Is LDCT lung cancer screening right for me?  In some cases, you should not have LDCT lung screening, such as when your doctor is already following your cancer with CT scan studies. Your doctor will help you decide if LDCT lung screening is right for you.  Do I need to have a screening exam every year?  Yes. If you are in the high-risk group described earlier, you should get an LDCT lung cancer screening exam every year until you are 79, or are no longer willing or able to undergo screening and possible procedures to diagnose and treat lung cancer.  How effective is LDCT at preventing death from lung cancer?  Studies have shown that LDCT lung cancer screening can lower the risk of death from lung cancer by 20 percent in people who are at high-risk.  What are the risks?  There are some risks and limitations of LDCT lung cancer screening. We want to make sure you understand the risks and benefits, so please let us know if you have any questions. Your doctor may want to talk with you more about these risks.    Radiation exposure: As with any exam that uses radiation, there is a very small increased risk of cancer. The amount of radiation in LDCT is small--about the same amount a person would get from a mammogram. Your doctor orders the exam when he or she feels the potential benefits outweigh the risks.    False negatives: No test is perfect, including LDCT. It is possible that you may have a medical condition, including lung cancer, that is not found during your exam. This is called a false negative result.    False positives and more testing: LDCT very often finds  something in the lung that could be cancer, but in fact is not. This is called a false positive result. False positive tests often cause anxiety. To make sure these findings are not cancer, you may need to have more tests. These tests will be done only if you give us permission. Sometimes patients need a treatment that can have side effects, such as a biopsy. For more information on false positives, see  What can I expect from the results?     Findings not related to lung cancer: Your LDCT exam also takes pictures of areas of your body next to your lungs. In a very small number of cases, the CT scan will show an abnormal finding in one of these areas, such as your kidneys, adrenal glands, liver or thyroid. This finding may not be serious, but you may need more tests. Your doctor can help you decide what other tests you may need, if any.  What can I expect from the results?  About 1 out of 4 LDCT exams will find something that may need more tests. Most of the time, these findings are lung nodules. Lung nodules are very small collections of tissue in the lung. These nodules are very common, and the vast majority--more than 97 percent--are not cancer (benign). Most are normal lymph nodes or small areas of scarring from past infections.  But, if a small lung nodule is found to be cancer, the cancer can be cured more than 90 percent of the time. To know if the nodule is cancer, we may need to get more images before your next yearly screening exam. If the nodule has suspicious features (for example, it is large, has an odd shape or grows over time), we will refer you to a specialist for further testing.  Will my doctor also get the results?  Yes. Your doctor will get a copy of your results.  Is it okay to keep smoking now that there s a cancer screening exam?  No. Tobacco is one of the strongest cancer-causing agents. It causes not only lung cancer, but other cancers and cardiovascular (heart) diseases as well. The damage  caused by smoking builds over time. This means that the longer you smoke, the higher your risk of disease. While it is never too late to quit, the sooner you quit, the better.  Where can I find help to quit smoking?  The best way to prevent lung cancer is to stop smoking. If you have already quit smoking, congratulations and keep it up! For help on quitting smoking, please call [employer] at 7-189-QUITNOW (1-663.199.5633) or the [employer] at 1-373.562.8787 to find local resources near you.  One-on-one health coaching:  If you d prefer to work individually with a health care provider on tobacco cessation, we offer:      Medication Therapy Management:  Our specially trained pharmacists work closely with you and your doctor to help you quit smoking.  Call [phone] or [phone] (toll free).

## 2023-04-28 ENCOUNTER — ANCILLARY PROCEDURE (OUTPATIENT)
Dept: CT IMAGING | Facility: CLINIC | Age: 58
End: 2023-04-28
Attending: FAMILY MEDICINE
Payer: COMMERCIAL

## 2023-04-28 DIAGNOSIS — Z12.2 SCREENING FOR LUNG CANCER: ICD-10-CM

## 2023-04-28 DIAGNOSIS — Z87.891 PERSONAL HISTORY OF TOBACCO USE: ICD-10-CM

## 2023-04-28 PROCEDURE — 71271 CT THORAX LUNG CANCER SCR C-: CPT

## 2023-10-25 ENCOUNTER — E-VISIT (OUTPATIENT)
Dept: FAMILY MEDICINE | Facility: CLINIC | Age: 58
End: 2023-10-25
Payer: COMMERCIAL

## 2023-10-25 ENCOUNTER — TELEPHONE (OUTPATIENT)
Dept: OPHTHALMOLOGY | Facility: CLINIC | Age: 58
End: 2023-10-25

## 2023-10-25 DIAGNOSIS — H02.821 CYST OF RIGHT UPPER EYELID: Primary | ICD-10-CM

## 2023-10-25 PROCEDURE — 99421 OL DIG E/M SVC 5-10 MIN: CPT | Performed by: FAMILY MEDICINE

## 2023-10-25 RX ORDER — TRIAMCINOLONE ACETONIDE 0.25 MG/G
OINTMENT TOPICAL 2 TIMES DAILY
Qty: 15 G | Refills: 0 | Status: SHIPPED | OUTPATIENT
Start: 2023-10-25

## 2023-10-25 NOTE — TELEPHONE ENCOUNTER
Called and spoke to Kip     Made him an appointment for 10/30 @ 10 am with Dr. Marie for a cyst on the eye lid - Kip was seen at a different clinic for his cyst - this is a follow up     Brenda Aburto Communication Facilitator on 10/25/2023 at 10:02 AM

## 2023-10-30 ENCOUNTER — TELEPHONE (OUTPATIENT)
Dept: OPHTHALMOLOGY | Facility: CLINIC | Age: 58
End: 2023-10-30
Payer: COMMERCIAL

## 2023-10-31 ENCOUNTER — OFFICE VISIT (OUTPATIENT)
Dept: OPHTHALMOLOGY | Facility: CLINIC | Age: 58
End: 2023-10-31
Attending: FAMILY MEDICINE
Payer: COMMERCIAL

## 2023-10-31 DIAGNOSIS — H02.821 CYST OF RIGHT UPPER EYELID: ICD-10-CM

## 2023-10-31 DIAGNOSIS — H25.13 NUCLEAR SENILE CATARACT OF BOTH EYES: Primary | ICD-10-CM

## 2023-10-31 DIAGNOSIS — H35.61 RETINAL HEMORRHAGE OF RIGHT EYE: ICD-10-CM

## 2023-10-31 PROCEDURE — 99213 OFFICE O/P EST LOW 20 MIN: CPT | Performed by: OPTOMETRIST

## 2023-10-31 PROCEDURE — 92250 FUNDUS PHOTOGRAPHY W/I&R: CPT | Performed by: OPTOMETRIST

## 2023-10-31 PROCEDURE — 92004 COMPRE OPH EXAM NEW PT 1/>: CPT | Performed by: OPTOMETRIST

## 2023-10-31 ASSESSMENT — CONF VISUAL FIELD
OS_INFERIOR_NASAL_RESTRICTION: 0
OD_SUPERIOR_TEMPORAL_RESTRICTION: 0
OD_NORMAL: 1
OS_SUPERIOR_TEMPORAL_RESTRICTION: 0
OS_NORMAL: 1
OD_INFERIOR_NASAL_RESTRICTION: 0
METHOD: COUNTING FINGERS
OS_SUPERIOR_NASAL_RESTRICTION: 0
OD_SUPERIOR_NASAL_RESTRICTION: 0
OS_INFERIOR_TEMPORAL_RESTRICTION: 0
OD_INFERIOR_TEMPORAL_RESTRICTION: 0

## 2023-10-31 ASSESSMENT — VISUAL ACUITY
OD_SC: 20/20
METHOD: SNELLEN - LINEAR
OD_SC+: -1
OS_SC: 20/20

## 2023-10-31 ASSESSMENT — TONOMETRY
OD_IOP_MMHG: 20
IOP_METHOD: TONOPEN
OS_IOP_MMHG: 17

## 2023-10-31 ASSESSMENT — EXTERNAL EXAM - RIGHT EYE: OD_EXAM: NORMAL

## 2023-10-31 ASSESSMENT — SLIT LAMP EXAM - LIDS: COMMENTS: NORMAL

## 2023-10-31 ASSESSMENT — EXTERNAL EXAM - LEFT EYE: OS_EXAM: NORMAL

## 2023-10-31 NOTE — PROGRESS NOTES
HPI:  Patient presents for swelling on the right upper lid since 3 weeks ago. Patient is using warm compress as needed. Patient was also prescribed triamcinolone which made the bump more red. No recent trauma.     Social history: Retired from [employer] (in 2022).       Pertinent Medical History:  Smoker    Ocular History:  LASIK both eyes.   Bump right upper lid. 10/2023    Eye Medications:  Allergic to penicillins    Assessment and Plan:    #   Cyst, right upper lid.   Recommend follow up with with oculoplastics for possible I&D. Steroid and warm compress makes the cyst more red.     #   Cataract, both eyes.   Not visually significant.   Recommend UV protection.   Recommend annual dilated eye exam.      #   Peripheral Dot/Blot Hemorrhage, left eye.   Fundus photos 10/31/2023.   2 inferior peripheral dot/blot hemes seen on left eye 10/31/2023.   No history of diabetes.   Recommend follow up in the retina service.             Patient consented to a dilated eye exam:    Yes. Side effects discussed.    Medical History:  Past Medical History:   Diagnosis Date    Fatigue     Fatigue 7/1/2011    Lumbar disc herniation     L4-5    Smoker 7/1/2011       Medications:  Current Outpatient Medications   Medication Sig Dispense Refill    Ascorbic Acid (VITAMIN C PO) Take 1 tablet by mouth.      aspirin 81 MG EC tablet Take 81 mg by mouth daily      MULTI-VITAMIN OR TABS 1 TABLET DAILY      triamcinolone (KENALOG) 0.025 % external ointment Apply topically 2 times daily 15 g 0    VITAMIN D PO Take 1 tablet by mouth.     Complete documentation of historical and exam elements from today's encounter can be found in the full encounter summary report (not reduplicated in this progress note). I personally obtained the chief complaint(s) and history of present illness.  I confirmed and edited as necessary the review of systems, past medical/surgical history, family history, social history, and examination findings as documented by  others; and I examined the patient myself. I personally reviewed the relevant tests, images, and reports as documented above. I formulated and edited as necessary the assessment and plan and discussed the findings and management plan with the patient and family. - Jerica Ruff OD

## 2023-10-31 NOTE — NURSING NOTE
Chief Complaints and History of Present Illnesses   Patient presents with    Dermoid Cyst Evaluation     Chief Complaint(s) and History of Present Illness(es)       Dermoid Cyst Evaluation              Laterality: right upper lid    Onset: 3 weeks ago    Associated symptoms: lid swelling.  Negative for lid pain, discharge and tearing    Pain scale: 0/10              Comments    Pt reports approximately 2-3 weeks ago noticed a small bump on right upper lid.   Went saw PCP, where was prescribed Triamcinolone Acetonide last wednesday; bump was getting bigger and more red so discontinued using after couple days.  Now pt just does warm compress as needed. Is not using any eye drops at this time.   Had annual eye exam done beginning of October when cyst was first forming and did not seem as big a concern.     Mean Martinez OA, October 31, 2023

## 2023-11-28 DIAGNOSIS — H35.61 RETINAL HEMORRHAGE OF RIGHT EYE: Primary | ICD-10-CM

## 2023-12-05 ENCOUNTER — OFFICE VISIT (OUTPATIENT)
Dept: OPHTHALMOLOGY | Facility: CLINIC | Age: 58
End: 2023-12-05
Attending: OPHTHALMOLOGY
Payer: COMMERCIAL

## 2023-12-05 DIAGNOSIS — H25.13 NUCLEAR SENILE CATARACT OF BOTH EYES: ICD-10-CM

## 2023-12-05 DIAGNOSIS — H35.61 RETINAL HEMORRHAGE OF RIGHT EYE: Primary | ICD-10-CM

## 2023-12-05 DIAGNOSIS — H02.821 CYST OF RIGHT UPPER EYELID: ICD-10-CM

## 2023-12-05 DIAGNOSIS — H35.62 RETINAL HEMORRHAGE, LEFT EYE: ICD-10-CM

## 2023-12-05 PROCEDURE — 92250 FUNDUS PHOTOGRAPHY W/I&R: CPT | Performed by: OPHTHALMOLOGY

## 2023-12-05 PROCEDURE — 92134 CPTRZ OPH DX IMG PST SGM RTA: CPT | Performed by: OPHTHALMOLOGY

## 2023-12-05 PROCEDURE — 92004 COMPRE OPH EXAM NEW PT 1/>: CPT | Mod: GC | Performed by: OPHTHALMOLOGY

## 2023-12-05 PROCEDURE — 99207 FUNDUS PHOTOS OU (BOTH EYES): CPT | Mod: 26 | Performed by: OPHTHALMOLOGY

## 2023-12-05 PROCEDURE — 99214 OFFICE O/P EST MOD 30 MIN: CPT | Performed by: OPHTHALMOLOGY

## 2023-12-05 ASSESSMENT — VISUAL ACUITY
METHOD: SNELLEN - LINEAR
OD_SC+: -2
OD_SC: 20/25
OS_SC: 20/20

## 2023-12-05 ASSESSMENT — TONOMETRY
OS_IOP_MMHG: 18
IOP_METHOD: TONOPEN
OD_IOP_MMHG: 18

## 2023-12-05 ASSESSMENT — SLIT LAMP EXAM - LIDS: COMMENTS: NORMAL

## 2023-12-05 ASSESSMENT — CONF VISUAL FIELD
OD_INFERIOR_NASAL_RESTRICTION: 0
OS_INFERIOR_TEMPORAL_RESTRICTION: 0
OD_SUPERIOR_TEMPORAL_RESTRICTION: 0
OD_INFERIOR_TEMPORAL_RESTRICTION: 0
OD_NORMAL: 1
OS_SUPERIOR_TEMPORAL_RESTRICTION: 0
OD_SUPERIOR_NASAL_RESTRICTION: 0
OS_INFERIOR_NASAL_RESTRICTION: 0
OS_NORMAL: 1
OS_SUPERIOR_NASAL_RESTRICTION: 0

## 2023-12-05 ASSESSMENT — EXTERNAL EXAM - LEFT EYE: OS_EXAM: NORMAL

## 2023-12-05 ASSESSMENT — EXTERNAL EXAM - RIGHT EYE: OD_EXAM: NORMAL

## 2023-12-05 NOTE — NURSING NOTE
Chief Complaints and History of Present Illnesses   Patient presents with    Retinal Evaluation     Chief Complaint(s) and History of Present Illness(es)       Retinal Evaluation               Comments    Pt. States that he is doing well. No c/o VA BE. No flashes or floaters BE. No pain BE. Has had a bump on RUL that will not go away. Will be following up on this 2 weeks for possible removal.   Deepti Herndon COT 7:15 AM December 5, 2023

## 2023-12-05 NOTE — PROGRESS NOTES
CC -   Dot blot heme, left eye    INTERVAL HISTORY - Initial visit with me    HPI -   Kip Winn is a  58 year old year-old patient presenting for evaluation of dot blot hemorrhage in the left eye. Patient was seen by Dr. Marie on 10/31/23 who noted a peripheral retinal hemorrhage without history of diabetes or HTN. He has noted any vision changes, flashes or floaters. No eye pain.      PAST OCULAR SURGERY  LASIK both eyes (~)    RETINAL IMAGIN23  OCT   OD - Normal foveal contour, no IRF/SRF, hyaloid attached  OS - Normal foveal contour, no IRF/SRF, hyaloid attached      ASSESSMENT & PLAN    # Dot blot hemorrhage, both eyes    - bilateral peripheral dbh noted on exam 10/31/23, left eye probably less today 23   - discussed possible retinal ischemic diseases vs systemic diseases   - he is healthy with no known systemic disease; will order a CBC diff      - RTC 3-4 months for repeat DFE to ensure resolution; if not resolved, will do FA    # NS cataract ou: minimal, observe    # RUL lesion: will be seeing oculoplastics         Trinidad De Oliveira MD  Resident Physician, PGY-3  Department of Ophthalmology      Complete documentation of historical and exam elements from today's encounter can be found in the full encounter summary report (not reduplicated in this progress note). I personally obtained the chief complaint(s) and history of present illness.  I confirmed and edited as necessary the review of systems, past medical/surgical history, family history, social history, and examination findings as documented by others; and I examined the patient myself. I personally reviewed the relevant tests, images, and reports as documented above. I formulated and edited as necessary the assessment and plan and discussed the findings and management plan with the patient and family.    Johnathan Issa MD, PhD

## 2023-12-12 ENCOUNTER — LAB (OUTPATIENT)
Dept: LAB | Facility: CLINIC | Age: 58
End: 2023-12-12
Payer: COMMERCIAL

## 2023-12-12 DIAGNOSIS — H35.61 RETINAL HEMORRHAGE OF RIGHT EYE: ICD-10-CM

## 2023-12-12 DIAGNOSIS — H35.62 RETINAL HEMORRHAGE, LEFT EYE: ICD-10-CM

## 2023-12-12 LAB
BASOPHILS # BLD AUTO: 0 10E3/UL (ref 0–0.2)
BASOPHILS NFR BLD AUTO: 1 %
EOSINOPHIL # BLD AUTO: 0.1 10E3/UL (ref 0–0.7)
EOSINOPHIL NFR BLD AUTO: 2 %
ERYTHROCYTE [DISTWIDTH] IN BLOOD BY AUTOMATED COUNT: 11.7 % (ref 10–15)
HCT VFR BLD AUTO: 41.5 % (ref 40–53)
HGB BLD-MCNC: 14.3 G/DL (ref 13.3–17.7)
IMM GRANULOCYTES # BLD: 0 10E3/UL
IMM GRANULOCYTES NFR BLD: 0 %
LYMPHOCYTES # BLD AUTO: 2.2 10E3/UL (ref 0.8–5.3)
LYMPHOCYTES NFR BLD AUTO: 35 %
MCH RBC QN AUTO: 31.2 PG (ref 26.5–33)
MCHC RBC AUTO-ENTMCNC: 34.5 G/DL (ref 31.5–36.5)
MCV RBC AUTO: 91 FL (ref 78–100)
MONOCYTES # BLD AUTO: 0.5 10E3/UL (ref 0–1.3)
MONOCYTES NFR BLD AUTO: 8 %
NEUTROPHILS # BLD AUTO: 3.5 10E3/UL (ref 1.6–8.3)
NEUTROPHILS NFR BLD AUTO: 55 %
PLATELET # BLD AUTO: 269 10E3/UL (ref 150–450)
RBC # BLD AUTO: 4.58 10E6/UL (ref 4.4–5.9)
WBC # BLD AUTO: 6.4 10E3/UL (ref 4–11)

## 2023-12-12 PROCEDURE — 85025 COMPLETE CBC W/AUTO DIFF WBC: CPT

## 2023-12-12 PROCEDURE — 36415 COLL VENOUS BLD VENIPUNCTURE: CPT

## 2023-12-22 ENCOUNTER — OFFICE VISIT (OUTPATIENT)
Dept: OPHTHALMOLOGY | Facility: CLINIC | Age: 58
End: 2023-12-22
Payer: COMMERCIAL

## 2023-12-22 DIAGNOSIS — H02.9 LESION OF RIGHT UPPER EYELID: Primary | ICD-10-CM

## 2023-12-22 PROCEDURE — 92285 EXTERNAL OCULAR PHOTOGRAPHY: CPT | Mod: GC | Performed by: OPHTHALMOLOGY

## 2023-12-22 PROCEDURE — 99213 OFFICE O/P EST LOW 20 MIN: CPT | Mod: GC | Performed by: OPHTHALMOLOGY

## 2023-12-22 ASSESSMENT — VISUAL ACUITY
OD_SC: 20/25
OS_SC: 20/20
OD_SC+: -2
METHOD: SNELLEN - LINEAR

## 2023-12-22 ASSESSMENT — EXTERNAL EXAM - LEFT EYE: OS_EXAM: NORMAL

## 2023-12-22 ASSESSMENT — SLIT LAMP EXAM - LIDS: COMMENTS: NORMAL

## 2023-12-22 ASSESSMENT — EXTERNAL EXAM - RIGHT EYE: OD_EXAM: NORMAL

## 2023-12-22 NOTE — LETTER
2023         RE:  :  MRN: Kip Winn  1965  [id number]     Dear Dr. Jerica Marie,    Thank you for asking me to see your patient, Kip Winn, for an oculoplastic   consultation.  My assessment and plan are below.  For further details, please see my attached clinic note.      Chief Complaint(s) and History of Present Illness(es)     Eyelid Cyst Evaluation            Laterality: right upper lid          Comments    Pt referred for evaluation of cyst RUL. He states onset was 10/1/2023. He   reports that his primary initially prescribed him a steroid jesus, but this   made the cyst much larger. He discontinued the jesus, and is now only using   a face wash. It is much smaller now in appearance, and he would like to   know if it will resolve on its own or if it might require treatment. No   discomfort or itching. Mild pink appearance.     Referred by Jerica Marie for eyelid lesion.     Assessment & Plan     Kip Winn is a 58 year old male with the following diagnoses:   Encounter Diagnosis   Name Primary?     Lesion of right upper eyelid Yes       - Onset about 2 months ago   - PCP prescribed patient triamcinolone cream but the cream made the lesion swell up so discontinued  - Thinks it has been getting smaller in the last month  - Denies any pain, itchiness, or discharge  - Patient only interested in removal today if any worrisome features     Gisel Cartwright MD  PGY-2  Department of Ophthalmology  2023 8:00 AM     No immunosuppression  No prior skin cancer  + smoker    I recommend biopsy. He feels it is getting smaller so would like to observe and schedule a return visit. Can apply Vaseline or Aquaphor for now. Photos were obtained and he has numerous photos on his phone for future comparison.         Again, thank you for allowing me to participate in the care of your patient.      Sincerely,    Rolando Carlson MD  Department of Ophthalmology and Visual Neurosciences  [facility]  [place]    CC: Jerica Marie, OD  [address] [facility] [id number]  Via In Basket

## 2023-12-22 NOTE — PROGRESS NOTES
Chief Complaint(s) and History of Present Illness(es)     Eyelid Cyst Evaluation            Laterality: right upper lid          Comments    Pt referred for evaluation of cyst RUL. He states onset was 10/1/2023. He   reports that his primary initially prescribed him a steroid jesus, but this   made the cyst much larger. He discontinued the jesus, and is now only using   a face wash. It is much smaller now in appearance, and he would like to   know if it will resolve on its own or if it might require treatment. No   discomfort or itching. Mild pink appearance.     Referred by Jerica Marie for eyelid lesion.     Assessment & Plan     Kip Winn is a 58 year old male with the following diagnoses:   Encounter Diagnosis   Name Primary?    Lesion of right upper eyelid Yes       - Onset about 2 months ago   - PCP prescribed patient triamcinolone cream but the cream made the lesion swell up so discontinued  - Thinks it has been getting smaller in the last month  - Denies any pain, itchiness, or discharge  - Patient only interested in removal today if any worrisome features     Gisel Cartwright MD  PGY-2  Department of Ophthalmology  December 22, 2023 8:00 AM     No immunosuppression  No prior skin cancer  + smoker    I recommend biopsy. He feels it is getting smaller so would like to observe and schedule a return visit. Can apply Vaseline or Aquaphor for now. Photos were obtained and he has numerous photos on his phone for future comparison.        Attending Physician Attestation: Complete documentation of historical and exam elements from today's encounter can be found in the full encounter summary report (not reduplicated in this progress note). I personally obtained the chief complaint(s) and history of present illness. I confirmed and edited as necessary the review of systems, past medical/surgical history, family history, social history, and examination findings as documented by others; and I examined the patient  myself. I personally reviewed the relevant tests, images, and reports as documented above. I formulated and edited as necessary the assessment and plan and discussed the findings and management plan with the patient.  -Rolando Carlson MD

## 2023-12-22 NOTE — NURSING NOTE
Chief Complaints and History of Present Illnesses   Patient presents with    Eyelid Cyst Evaluation     Chief Complaint(s) and History of Present Illness(es)       Eyelid Cyst Evaluation              Laterality: right upper lid              Comments    Pt referred for evaluation of cyst RUL. He states onset was 10/1/2023. He reports that his primary initially prescribed him a steroid jesus, but this made the cyst much larger. He discontinued the jesus, and is now only using a face wash. It is much smaller now in appearance, and he would like to know if it will resolve on its own or if it might require treatment. No discomfort or itching. Mild pink appearance.

## 2024-02-02 ENCOUNTER — E-VISIT (OUTPATIENT)
Dept: FAMILY MEDICINE | Facility: CLINIC | Age: 59
End: 2024-02-02
Payer: COMMERCIAL

## 2024-02-02 ENCOUNTER — LAB (OUTPATIENT)
Dept: LAB | Facility: CLINIC | Age: 59
End: 2024-02-02
Payer: COMMERCIAL

## 2024-02-02 DIAGNOSIS — Z11.3 SCREEN FOR STD (SEXUALLY TRANSMITTED DISEASE): ICD-10-CM

## 2024-02-02 DIAGNOSIS — R30.0 DYSURIA: ICD-10-CM

## 2024-02-02 DIAGNOSIS — R30.0 DYSURIA: Primary | ICD-10-CM

## 2024-02-02 LAB
ALBUMIN UR-MCNC: NEGATIVE MG/DL
APPEARANCE UR: CLEAR
BACTERIA #/AREA URNS HPF: ABNORMAL /HPF
BILIRUB UR QL STRIP: NEGATIVE
COLOR UR AUTO: YELLOW
GLUCOSE UR STRIP-MCNC: NEGATIVE MG/DL
HGB UR QL STRIP: ABNORMAL
KETONES UR STRIP-MCNC: NEGATIVE MG/DL
LEUKOCYTE ESTERASE UR QL STRIP: NEGATIVE
NITRATE UR QL: NEGATIVE
PH UR STRIP: 6.5 [PH] (ref 5–7)
RBC #/AREA URNS AUTO: ABNORMAL /HPF
SP GR UR STRIP: <=1.005 (ref 1–1.03)
UROBILINOGEN UR STRIP-ACNC: 0.2 E.U./DL
WBC #/AREA URNS AUTO: ABNORMAL /HPF

## 2024-02-02 PROCEDURE — 87389 HIV-1 AG W/HIV-1&-2 AB AG IA: CPT

## 2024-02-02 PROCEDURE — 81001 URINALYSIS AUTO W/SCOPE: CPT

## 2024-02-02 PROCEDURE — 87491 CHLMYD TRACH DNA AMP PROBE: CPT

## 2024-02-02 PROCEDURE — 99421 OL DIG E/M SVC 5-10 MIN: CPT | Performed by: FAMILY MEDICINE

## 2024-02-02 PROCEDURE — 86803 HEPATITIS C AB TEST: CPT

## 2024-02-02 PROCEDURE — 36415 COLL VENOUS BLD VENIPUNCTURE: CPT

## 2024-02-02 PROCEDURE — 87591 N.GONORRHOEAE DNA AMP PROB: CPT

## 2024-02-03 LAB
C TRACH DNA SPEC QL NAA+PROBE: NEGATIVE
HCV AB SERPL QL IA: NONREACTIVE
HIV 1+2 AB+HIV1 P24 AG SERPL QL IA: NONREACTIVE
N GONORRHOEA DNA SPEC QL NAA+PROBE: NEGATIVE

## 2024-03-01 SDOH — HEALTH STABILITY: PHYSICAL HEALTH: ON AVERAGE, HOW MANY MINUTES DO YOU ENGAGE IN EXERCISE AT THIS LEVEL?: 70 MIN

## 2024-03-01 SDOH — HEALTH STABILITY: PHYSICAL HEALTH: ON AVERAGE, HOW MANY DAYS PER WEEK DO YOU ENGAGE IN MODERATE TO STRENUOUS EXERCISE (LIKE A BRISK WALK)?: 5 DAYS

## 2024-03-01 ASSESSMENT — SOCIAL DETERMINANTS OF HEALTH (SDOH): HOW OFTEN DO YOU GET TOGETHER WITH FRIENDS OR RELATIVES?: ONCE A WEEK

## 2024-03-07 ENCOUNTER — PATIENT OUTREACH (OUTPATIENT)
Dept: ONCOLOGY | Facility: CLINIC | Age: 59
End: 2024-03-07

## 2024-03-07 ENCOUNTER — OFFICE VISIT (OUTPATIENT)
Dept: FAMILY MEDICINE | Facility: CLINIC | Age: 59
End: 2024-03-07
Payer: COMMERCIAL

## 2024-03-07 VITALS
BODY MASS INDEX: 22.07 KG/M2 | RESPIRATION RATE: 14 BRPM | HEART RATE: 90 BPM | TEMPERATURE: 97.5 F | DIASTOLIC BLOOD PRESSURE: 78 MMHG | HEIGHT: 74 IN | WEIGHT: 172 LBS | SYSTOLIC BLOOD PRESSURE: 134 MMHG | OXYGEN SATURATION: 99 %

## 2024-03-07 DIAGNOSIS — Z80.42 FAMILY HISTORY OF PROSTATE CANCER: Primary | ICD-10-CM

## 2024-03-07 DIAGNOSIS — Z80.1 FAMILY HISTORY OF LUNG CANCER: ICD-10-CM

## 2024-03-07 DIAGNOSIS — Z13.220 SCREENING FOR HYPERLIPIDEMIA: ICD-10-CM

## 2024-03-07 DIAGNOSIS — Z12.2 SCREENING FOR LUNG CANCER: ICD-10-CM

## 2024-03-07 DIAGNOSIS — F41.9 ANXIETY: ICD-10-CM

## 2024-03-07 DIAGNOSIS — Z00.00 ENCOUNTER FOR ANNUAL PHYSICAL EXAM: ICD-10-CM

## 2024-03-07 DIAGNOSIS — R73.9 ELEVATED BLOOD SUGAR: ICD-10-CM

## 2024-03-07 LAB
ALBUMIN SERPL BCG-MCNC: 4.8 G/DL (ref 3.5–5.2)
ALP SERPL-CCNC: 66 U/L (ref 40–150)
ALT SERPL W P-5'-P-CCNC: 15 U/L (ref 0–70)
ANION GAP SERPL CALCULATED.3IONS-SCNC: 9 MMOL/L (ref 7–15)
AST SERPL W P-5'-P-CCNC: 19 U/L (ref 0–45)
BILIRUB SERPL-MCNC: 0.7 MG/DL
BUN SERPL-MCNC: 10.6 MG/DL (ref 6–20)
CALCIUM SERPL-MCNC: 9.4 MG/DL (ref 8.6–10)
CHLORIDE SERPL-SCNC: 103 MMOL/L (ref 98–107)
CHOLEST SERPL-MCNC: 155 MG/DL
CREAT SERPL-MCNC: 0.82 MG/DL (ref 0.67–1.17)
DEPRECATED HCO3 PLAS-SCNC: 28 MMOL/L (ref 22–29)
EGFRCR SERPLBLD CKD-EPI 2021: >90 ML/MIN/1.73M2
FASTING STATUS PATIENT QL REPORTED: YES
GLUCOSE SERPL-MCNC: 122 MG/DL (ref 70–99)
HCV AB SERPL QL IA: NONREACTIVE
HDLC SERPL-MCNC: 61 MG/DL
LDLC SERPL CALC-MCNC: 73 MG/DL
NONHDLC SERPL-MCNC: 94 MG/DL
POTASSIUM SERPL-SCNC: 4.5 MMOL/L (ref 3.4–5.3)
PROT SERPL-MCNC: 7.2 G/DL (ref 6.4–8.3)
PSA SERPL DL<=0.01 NG/ML-MCNC: 0.94 NG/ML (ref 0–3.5)
SODIUM SERPL-SCNC: 140 MMOL/L (ref 135–145)
TRIGL SERPL-MCNC: 107 MG/DL
TSH SERPL DL<=0.005 MIU/L-ACNC: 1.02 UIU/ML (ref 0.3–4.2)

## 2024-03-07 PROCEDURE — 80053 COMPREHEN METABOLIC PANEL: CPT | Performed by: FAMILY MEDICINE

## 2024-03-07 PROCEDURE — 86803 HEPATITIS C AB TEST: CPT | Performed by: FAMILY MEDICINE

## 2024-03-07 PROCEDURE — G0103 PSA SCREENING: HCPCS | Performed by: FAMILY MEDICINE

## 2024-03-07 PROCEDURE — 36415 COLL VENOUS BLD VENIPUNCTURE: CPT | Performed by: FAMILY MEDICINE

## 2024-03-07 PROCEDURE — 87389 HIV-1 AG W/HIV-1&-2 AB AG IA: CPT | Performed by: FAMILY MEDICINE

## 2024-03-07 PROCEDURE — 99213 OFFICE O/P EST LOW 20 MIN: CPT | Mod: 25 | Performed by: FAMILY MEDICINE

## 2024-03-07 PROCEDURE — 99396 PREV VISIT EST AGE 40-64: CPT | Performed by: FAMILY MEDICINE

## 2024-03-07 PROCEDURE — 80061 LIPID PANEL: CPT | Performed by: FAMILY MEDICINE

## 2024-03-07 PROCEDURE — 84443 ASSAY THYROID STIM HORMONE: CPT | Performed by: FAMILY MEDICINE

## 2024-03-07 ASSESSMENT — PAIN SCALES - GENERAL: PAINLEVEL: NO PAIN (0)

## 2024-03-07 NOTE — PROGRESS NOTES
Preventive Care Visit  [facility] DORI Richardson MD, Family Medicine  Mar 7, 2024    Assessment & Plan     Family history of prostate cancer    - Prostate Specific Antigen Screen; Future  - Prostate Specific Antigen Screen    Encounter for annual physical exam    - Lipid panel reflex to direct LDL Fasting; Future  - Prostate Specific Antigen Screen; Future  - Comprehensive metabolic panel; Future  - TSH with free T4 reflex; Future  - HIV Antigen Antibody Combo; Future  - Hepatitis C Screen Reflex to HCV RNA Quant and Genotype; Future  - Lipid panel reflex to direct LDL Fasting  - Prostate Specific Antigen Screen  - Comprehensive metabolic panel  - TSH with free T4 reflex  - HIV Antigen Antibody Combo  - Hepatitis C Screen Reflex to HCV RNA Quant and Genotype    Screening for hyperlipidemia    - Lipid panel reflex to direct LDL Fasting; Future  - Lipid panel reflex to direct LDL Fasting    Family history of lung cancer  Patient and family has done 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  He is currently up-to-date on his screening exams.  - Adult Genetics & Metabolism Referral; Future    Screening for lung cancer    - CT Chest Lung Cancer Scrn Low Dose wo; Future    Anxiety  Ongoing anxiety and mild depression we talked about different techniques to help with that however it has been ongoing suggested Zoloft and follow-up in 6 weeks to see if there is any improvement.  Side effects were discussed with the patient.  - sertraline (ZOLOFT) 50 MG tablet; Take 1 tablet (50 mg) by mouth daily        Nicotine/Tobacco Cessation  He reports that he has been smoking cigarettes. He started smoking about 43 years ago. He has a 25 pack-year smoking history. He has never used smokeless tobacco.  Nicotine/Tobacco Cessation Plan  Information offered: Patient not interested at this  time      Counseling  Appropriate preventive services were discussed with this patient, including applicable screening as appropriate for fall prevention, nutrition, physical activity, Tobacco-use cessation, weight loss and cognition.  Checklist reviewing preventive services available has been given to the patient.  Reviewed patient's diet, addressing concerns and/or questions.         Gloria Shin is a 58 year old, presenting for the following:  Physical (Fasting )  Patient is a pleasant 58-year-old male who came today with few issues.  He has been dealing with slight increased anxiety over the last 1 year however he is retired his blood pressure is less.  He feels like sometimes hard for him to calm himself down.  Secondly had brother and sister diagnosed with cancer Brother diagnosed with bladder cancer with some metastasis and sister has lung cancer.  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  He has been doing his screening exam appropriately and up-to-date on colonoscopy prostate and  lung cancer  Other risk factors include continue to smoke.      3/7/2024     9:00 AM   Additional Questions   Roomed by Daniel PRESSLEY        Health Care Directive  Patient does not have a Health Care Directive or Living Will: Patient states has Advance Directive and will bring in a copy to clinic.    HPI        3/1/2024   General Health   How would you rate your overall physical health? Good   Feel stress (tense, anxious, or unable to sleep) Rather much   (!) STRESS CONCERN      3/1/2024   Nutrition   Three or more servings of calcium each day? Yes   Diet: Regular (no restrictions)   How many servings of fruit and vegetables per day? (!) 2-3   How many sweetened beverages each day? 0-1         3/1/2024   Exercise   Days per week of moderate/strenous exercise 5 days   Average minutes spent exercising at this level 70 min         3/1/2024   Social Factors   Frequency of gathering with friends or  relatives Once a week   Worry food won't last until get money to buy more No   Food not last or not have enough money for food? No   Do you have housing?  Yes   Are you worried about losing your housing? No   Lack of transportation? No   Unable to get utilities (heat,electricity)? No         3/1/2024   Fall Risk   Fallen 2 or more times in the past year? No   Trouble with walking or balance? No          3/1/2024   Dental   Dentist two times every year? Yes         3/1/2024   TB Screening   Were you born outside of [place]?  No         Today's PHQ-2 Score:       3/6/2024    10:38 AM   PHQ-2 (  [employer])   Q1: Little interest or pleasure in doing things 0   Q2: Feeling down, depressed or hopeless 0   PHQ-2 Score 0   Q1: Little interest or pleasure in doing things Not at all   Q2: Feeling down, depressed or hopeless Not at all   PHQ-2 Score 0           3/1/2024   Substance Use   If I could quit smoking, I would Neutral   I want to quit somking, worry about health affects Completely agree   Willing to make a plan to quit smoking Neutral   Willing to cut down before quitting Neutral   Alcohol more than 3/day or more than 7/wk No   Do you use any other substances recreationally? No     Social History     Tobacco Use    Smoking status: Every Day     Packs/day: 1.00     Years: 25.00     Additional pack years: 0.00     Total pack years: 25.00     Types: Cigarettes     Start date: 1980     Last attempt to quit: 2018     Years since quittin.3    Smokeless tobacco: Never   Substance Use Topics    Alcohol use: Yes     Comment: socially    Drug use: No           3/1/2024   STI Screening   New sexual partner(s) since last STI/HIV test? No   Last PSA:   PSA   Date Value Ref Range Status   2021 0.88 0 - 4 ug/L Final     Comment:     Assay Method:  Chemiluminescence using Siemens Vista analyzer     Prostate Specific Antigen Screen   Date Value Ref Range Status   2023 0.81 0.00 - 3.50 ng/mL Final   2022  "0.98 0.00 - 4.00 ug/L Final     ASCVD Risk   The 10-year ASCVD risk score (Torrey BENITEZ, et al., 2019) is: 10.8%    Values used to calculate the score:      Age: 58 years      Sex: Male      Is Non- : No      Diabetic: No      Tobacco smoker: Yes      Systolic Blood Pressure: 140 mmHg      Is BP treated: No      HDL Cholesterol: 49 mg/dL      Total Cholesterol: 169 mg/dL           Reviewed and updated as needed this visit by Provider                          Review of Systems  Constitutional, HEENT, cardiovascular, pulmonary, gi and gu systems are negative, except as otherwise noted.     Objective    Exam  BP (!) 140/80   Pulse 100   Temp 97.5  F (36.4  C) (Tympanic)   Resp 14   Ht 1.887 m (6' 2.29\")   Wt 78 kg (172 lb)   SpO2 99%   BMI 21.91 kg/m     Estimated body mass index is 21.91 kg/m  as calculated from the following:    Height as of this encounter: 1.887 m (6' 2.29\").    Weight as of this encounter: 78 kg (172 lb).    Physical Exam  GENERAL: alert and no distress  NECK: no adenopathy, no asymmetry, masses, or scars  RESP: lungs clear to auscultation - no rales, rhonchi or wheezes  CV: regular rate and rhythm, normal S1 S2, no S3 or S4, no murmur, click or rub, no peripheral edema  ABDOMEN: soft, nontender, no hepatosplenomegaly, no masses and bowel sounds normal  MS: no gross musculoskeletal defects noted, no edema      Signed Electronically by: Dany Richardson MD    "

## 2024-03-07 NOTE — PROGRESS NOTES
Writer received referral to Cancer Risk Management/Genetic Counseling.    Referred for: Family history of lung cancer      Per referring note dated 3/7/24 Dr. 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     Chart flagged to obtain genetic testing that was done on pt.    Referral reviewed for appropriate plan, and sent to New Patient Scheduling ([phone]) for completion.    Ro Sorenson, RN, BSN  Oncology New Patient Nurse Navigator   [facility]  [phone]

## 2024-03-08 LAB — HIV 1+2 AB+HIV1 P24 AG SERPL QL IA: NONREACTIVE

## 2024-03-20 ENCOUNTER — LAB (OUTPATIENT)
Dept: LAB | Facility: CLINIC | Age: 59
End: 2024-03-20
Payer: COMMERCIAL

## 2024-03-20 DIAGNOSIS — R73.9 ELEVATED BLOOD SUGAR: ICD-10-CM

## 2024-03-20 LAB — HBA1C MFR BLD: 5.9 % (ref 0–5.6)

## 2024-03-20 PROCEDURE — 36415 COLL VENOUS BLD VENIPUNCTURE: CPT

## 2024-03-20 PROCEDURE — 83036 HEMOGLOBIN GLYCOSYLATED A1C: CPT

## 2024-04-29 ENCOUNTER — HOSPITAL ENCOUNTER (OUTPATIENT)
Dept: CT IMAGING | Facility: CLINIC | Age: 59
Discharge: HOME | End: 2024-04-29
Attending: FAMILY MEDICINE | Admitting: FAMILY MEDICINE
Payer: COMMERCIAL

## 2024-04-29 DIAGNOSIS — Z12.2 SCREENING FOR LUNG CANCER: ICD-10-CM

## 2024-04-29 PROCEDURE — 71271 CT THORAX LUNG CANCER SCR C-: CPT

## 2025-03-31 ENCOUNTER — MYC MEDICAL ADVICE (OUTPATIENT)
Dept: FAMILY MEDICINE | Facility: CLINIC | Age: 60
End: 2025-03-31
Payer: COMMERCIAL

## 2025-03-31 DIAGNOSIS — Z87.891 PERSONAL HISTORY OF TOBACCO USE: Primary | ICD-10-CM

## 2025-03-31 PROCEDURE — G0296 VISIT TO DETERM LDCT ELIG: HCPCS | Performed by: FAMILY MEDICINE

## 2025-04-01 NOTE — TELEPHONE ENCOUNTER
Lung Cancer Screening Shared Decision Making Visit     Kip Winn, a 59 year old male, is eligible for lung cancer screening    History   Smoking Status     Every Day     Types: Cigarettes   Smokeless Tobacco     Never       I have discussed with patient the risks and benefits of screening for lung cancer with low-dose CT.     The risks include:    radiation exposure: one low dose chest CT has as much ionizing radiation as about 15 chest x-rays, or 6 months of background radiation living in [place]      false positives: most findings/nodules are NOT cancer, but some might still require additional diagnostic evaluation, including biopsy    over-diagnosis: some slow growing cancers that might never have been clinically significant will be detected and treated unnecessarily     The benefit of early detection of lung cancer is contingent upon adherence to annual screening or more frequent follow up if indicated.     Furthermore, to benefit from screening, Kip must be willing and able to undergo diagnostic procedures, if indicated. Although no specific guide is available for determining severity of comorbidities, it is reasonable to withhold screening in patients who have greater mortality risk from other diseases.     We did discuss that the best way to prevent lung cancer is to not smoke.    Some patients may value a numeric estimation of lung cancer risk when evaluating if lung cancer screening is right for them, here is one calculator:    ShouldIScreen

## 2025-04-01 NOTE — PATIENT INSTRUCTIONS
Lung Cancer Screening   Frequently Asked Questions  If you are at high-risk for lung cancer, getting screened with low-dose computed tomography (LDCT) every year can help save your life. This handout offers answers to some of the most common questions about lung cancer screening. If you have other questions, please call [phone] ([phone]).     What is it?  Lung cancer screening uses special X-ray technology to create an image of your lung tissue. The exam is quick and easy and takes less than 10 seconds. We don t give you any medicine or use any needles. You can eat before and after the exam. You don t need to change your clothes as long as the clothing on your chest doesn t contain metal. But, you do need to be able to hold your breath for at least 6 seconds during the exam.    What is the goal of lung cancer screening?  The goal of lung cancer screening is to save lives. Many times, lung cancer is not found until a person starts having physical symptoms. Lung cancer screening can help detect lung cancer in the earliest stages when it may be easier to treat.    Who should be screened for lung cancer?  We suggest lung cancer screening for anyone who is at high-risk for lung cancer. You are in the high-risk group if you:      are between the ages of 55 and 79, and    have smoked at least 1 pack of cigarettes a day for 20 or more years, and    still smoke or have quit within the past 15 years.    However, if you have a new cough or shortness of breath, you should talk to your doctor before being screened.    Why does it matter if I have symptoms?  Certain symptoms can be a sign that you have a condition in your lungs that should be checked and treated by your doctor. These symptoms include fever, chest pain, a new or changing cough, shortness of breath that you have never felt before, coughing up blood or unexplained weight loss. Having any of these symptoms can greatly affect the results of lung  cancer screening.       Should all smokers get an LDCT lung cancer screening exam?  It depends. Lung cancer screening is for a very specific group of men and women who have a history of heavy smoking over a long period of time (see  Who should be screened for lung cancer  above).  I am in the high-risk group, but have been diagnosed with cancer in the past. Is LDCT lung cancer screening right for me?  In some cases, you should not have LDCT lung screening, such as when your doctor is already following your cancer with CT scan studies. Your doctor will help you decide if LDCT lung screening is right for you.  Do I need to have a screening exam every year?  Yes. If you are in the high-risk group described earlier, you should get an LDCT lung cancer screening exam every year until you are 79, or are no longer willing or able to undergo screening and possible procedures to diagnose and treat lung cancer.  How effective is LDCT at preventing death from lung cancer?  Studies have shown that LDCT lung cancer screening can lower the risk of death from lung cancer by 20 percent in people who are at high-risk.  What are the risks?  There are some risks and limitations of LDCT lung cancer screening. We want to make sure you understand the risks and benefits, so please let us know if you have any questions. Your doctor may want to talk with you more about these risks.    Radiation exposure: As with any exam that uses radiation, there is a very small increased risk of cancer. The amount of radiation in LDCT is small--about the same amount a person would get from a mammogram. Your doctor orders the exam when he or she feels the potential benefits outweigh the risks.    False negatives: No test is perfect, including LDCT. It is possible that you may have a medical condition, including lung cancer, that is not found during your exam. This is called a false negative result.    False positives and more testing: LDCT very often finds  something in the lung that could be cancer, but in fact is not. This is called a false positive result. False positive tests often cause anxiety. To make sure these findings are not cancer, you may need to have more tests. These tests will be done only if you give us permission. Sometimes patients need a treatment that can have side effects, such as a biopsy. For more information on false positives, see  What can I expect from the results?     Findings not related to lung cancer: Your LDCT exam also takes pictures of areas of your body next to your lungs. In a very small number of cases, the CT scan will show an abnormal finding in one of these areas, such as your kidneys, adrenal glands, liver or thyroid. This finding may not be serious, but you may need more tests. Your doctor can help you decide what other tests you may need, if any.  What can I expect from the results?  About 1 out of 4 LDCT exams will find something that may need more tests. Most of the time, these findings are lung nodules. Lung nodules are very small collections of tissue in the lung. These nodules are very common, and the vast majority--more than 97 percent--are not cancer (benign). Most are normal lymph nodes or small areas of scarring from past infections.  But, if a small lung nodule is found to be cancer, the cancer can be cured more than 90 percent of the time. To know if the nodule is cancer, we may need to get more images before your next yearly screening exam. If the nodule has suspicious features (for example, it is large, has an odd shape or grows over time), we will refer you to a specialist for further testing.  Will my doctor also get the results?  Yes. Your doctor will get a copy of your results.  Is it okay to keep smoking now that there s a cancer screening exam?  No. Tobacco is one of the strongest cancer-causing agents. It causes not only lung cancer, but other cancers and cardiovascular (heart) diseases as well. The damage  caused by smoking builds over time. This means that the longer you smoke, the higher your risk of disease. While it is never too late to quit, the sooner you quit, the better.  Where can I find help to quit smoking?  The best way to prevent lung cancer is to stop smoking. If you have already quit smoking, congratulations and keep it up! For help on quitting smoking, please call [employer] at 9-203-QUITNOW (1-897.335.9990) or the [employer] at 1-674.576.1890 to find local resources near you.  One-on-one health coaching:  If you d prefer to work individually with a health care provider on tobacco cessation, we offer:      Medication Therapy Management:  Our specially trained pharmacists work closely with you and your doctor to help you quit smoking.  Call [phone] or [phone] (toll free).

## 2025-04-24 ENCOUNTER — LAB (OUTPATIENT)
Dept: LAB | Facility: CLINIC | Age: 60
End: 2025-04-24
Payer: COMMERCIAL

## 2025-04-24 DIAGNOSIS — R73.9 ELEVATED BLOOD SUGAR: ICD-10-CM

## 2025-04-24 LAB
EST. AVERAGE GLUCOSE BLD GHB EST-MCNC: 108 MG/DL
HBA1C MFR BLD: 5.4 % (ref 0–5.6)

## 2025-04-30 ENCOUNTER — ANCILLARY PROCEDURE (OUTPATIENT)
Dept: CT IMAGING | Facility: CLINIC | Age: 60
End: 2025-04-30
Attending: FAMILY MEDICINE
Payer: COMMERCIAL

## 2025-04-30 DIAGNOSIS — Z87.891 PERSONAL HISTORY OF TOBACCO USE: ICD-10-CM

## 2025-04-30 PROCEDURE — 71271 CT THORAX LUNG CANCER SCR C-: CPT

## (undated) DEVICE — TUBING SUCTION MEDI-VAC SOFT 3/16"X20' N520A

## (undated) DEVICE — GLOVE PROTEXIS W/NEU-THERA 7.5  2D73TE75

## (undated) DEVICE — SPONGE SURGIFOAM 100 1974

## (undated) DEVICE — SUCTION FRAZIER 12FR W/OBTURATOR 33120

## (undated) DEVICE — LINEN POUCH DBL 5427

## (undated) DEVICE — SYR 10ML LL W/O NDL 302995

## (undated) DEVICE — GOWN XXL 9575

## (undated) DEVICE — ESU GROUND PAD ADULT W/CORD E7507

## (undated) DEVICE — DRSG KERLIX FLUFFS X5

## (undated) DEVICE — SPONGE COTTONOID 1/2X1/2" 80-1400

## (undated) DEVICE — DECANTER BAG 2002S

## (undated) DEVICE — MIDAS REX DISSECTING TOOL  14MH30

## (undated) DEVICE — PEN MARKING SKIN W/LABELS 31145884

## (undated) DEVICE — GLOVE PROTEXIS BLUE W/NEU-THERA 8.5  2D73EB85

## (undated) DEVICE — PACK SMALL SPINE RIDGES

## (undated) DEVICE — SU WND CLOSURE VLOC 180 ABS 3-0 12" P-14 VLOCL0114

## (undated) DEVICE — RX SURGIFLO HEMOSTATIC MATRIX 8ML 2991

## (undated) DEVICE — GLOVE PROTEXIS W/NEU-THERA 8.5  2D73TE85

## (undated) DEVICE — SU VICRYL 0 CT-2 CR 8X18" J727D

## (undated) DEVICE — DRAPE MICROSCOPE OPMI ZEISS 48X118" 306071-0000-000

## (undated) DEVICE — CUSHION INSERT LG PRONE VIEW JACKSON TABLE

## (undated) DEVICE — SU VICRYL 2-0 CT-2 CR 8X18" J726D

## (undated) DEVICE — LINEN ORTHO ACL PACK 5447

## (undated) DEVICE — ESU CLEANER TIP 31142717

## (undated) RX ORDER — LIDOCAINE HYDROCHLORIDE 10 MG/ML
INJECTION, SOLUTION EPIDURAL; INFILTRATION; INTRACAUDAL; PERINEURAL
Status: DISPENSED
Start: 2018-11-19

## (undated) RX ORDER — FENTANYL CITRATE 50 UG/ML
INJECTION, SOLUTION INTRAMUSCULAR; INTRAVENOUS
Status: DISPENSED
Start: 2018-11-19

## (undated) RX ORDER — GLYCOPYRROLATE 0.2 MG/ML
INJECTION INTRAMUSCULAR; INTRAVENOUS
Status: DISPENSED
Start: 2018-11-19

## (undated) RX ORDER — CLINDAMYCIN PHOSPHATE 900 MG/50ML
INJECTION, SOLUTION INTRAVENOUS
Status: DISPENSED
Start: 2018-11-19

## (undated) RX ORDER — KETOROLAC TROMETHAMINE 30 MG/ML
INJECTION, SOLUTION INTRAMUSCULAR; INTRAVENOUS
Status: DISPENSED
Start: 2018-11-19

## (undated) RX ORDER — ONDANSETRON 2 MG/ML
INJECTION INTRAMUSCULAR; INTRAVENOUS
Status: DISPENSED
Start: 2018-11-19

## (undated) RX ORDER — FENTANYL CITRATE 50 UG/ML
INJECTION, SOLUTION INTRAMUSCULAR; INTRAVENOUS
Status: DISPENSED
Start: 2018-03-19

## (undated) RX ORDER — BUPIVACAINE HYDROCHLORIDE AND EPINEPHRINE 5; 5 MG/ML; UG/ML
INJECTION, SOLUTION EPIDURAL; INTRACAUDAL; PERINEURAL
Status: DISPENSED
Start: 2018-11-19

## (undated) RX ORDER — EPHEDRINE SULFATE 50 MG/ML
INJECTION, SOLUTION INTRAVENOUS
Status: DISPENSED
Start: 2018-11-19

## (undated) RX ORDER — DEXAMETHASONE SODIUM PHOSPHATE 4 MG/ML
INJECTION, SOLUTION INTRA-ARTICULAR; INTRALESIONAL; INTRAMUSCULAR; INTRAVENOUS; SOFT TISSUE
Status: DISPENSED
Start: 2018-11-19

## (undated) RX ORDER — DEXAMETHASONE SODIUM PHOSPHATE 10 MG/ML
INJECTION, SOLUTION INTRAMUSCULAR; INTRAVENOUS
Status: DISPENSED
Start: 2017-12-28

## (undated) RX ORDER — METOPROLOL TARTRATE 1 MG/ML
INJECTION, SOLUTION INTRAVENOUS
Status: DISPENSED
Start: 2018-11-19

## (undated) RX ORDER — PROMETHAZINE HYDROCHLORIDE 25 MG/ML
INJECTION, SOLUTION INTRAMUSCULAR; INTRAVENOUS
Status: DISPENSED
Start: 2018-11-19

## (undated) RX ORDER — PHENYLEPHRINE HCL IN 0.9% NACL 1 MG/10 ML
SYRINGE (ML) INTRAVENOUS
Status: DISPENSED
Start: 2018-11-19

## (undated) RX ORDER — NEOSTIGMINE METHYLSULFATE 1 MG/ML
VIAL (ML) INJECTION
Status: DISPENSED
Start: 2018-11-19

## (undated) RX ORDER — VANCOMYCIN HYDROCHLORIDE 1 G/20ML
INJECTION, POWDER, LYOPHILIZED, FOR SOLUTION INTRAVENOUS
Status: DISPENSED
Start: 2018-11-19

## (undated) RX ORDER — LIDOCAINE HYDROCHLORIDE 10 MG/ML
INJECTION, SOLUTION EPIDURAL; INFILTRATION; INTRACAUDAL; PERINEURAL
Status: DISPENSED
Start: 2017-12-28

## (undated) RX ORDER — PROPOFOL 10 MG/ML
INJECTION, EMULSION INTRAVENOUS
Status: DISPENSED
Start: 2018-11-19